# Patient Record
Sex: FEMALE | Race: WHITE | NOT HISPANIC OR LATINO | URBAN - METROPOLITAN AREA
[De-identification: names, ages, dates, MRNs, and addresses within clinical notes are randomized per-mention and may not be internally consistent; named-entity substitution may affect disease eponyms.]

---

## 2017-05-26 ENCOUNTER — OUTPATIENT (OUTPATIENT)
Dept: OUTPATIENT SERVICES | Facility: HOSPITAL | Age: 62
LOS: 1 days | Discharge: HOME | End: 2017-05-26

## 2017-06-28 DIAGNOSIS — F31.32 BIPOLAR DISORDER, CURRENT EPISODE DEPRESSED, MODERATE: ICD-10-CM

## 2017-07-28 ENCOUNTER — EMERGENCY (EMERGENCY)
Facility: HOSPITAL | Age: 62
LOS: 0 days | Discharge: HOME | End: 2017-07-28
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
## 2017-07-28 DIAGNOSIS — S51.851A OPEN BITE OF RIGHT FOREARM, INITIAL ENCOUNTER: ICD-10-CM

## 2017-07-28 DIAGNOSIS — Z79.899 OTHER LONG TERM (CURRENT) DRUG THERAPY: ICD-10-CM
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
## 2017-08-01 ENCOUNTER — EMERGENCY (EMERGENCY)
Facility: HOSPITAL | Age: 62
LOS: 0 days | Discharge: HOME | End: 2017-08-01

## 2017-08-01 DIAGNOSIS — S51.851D OPEN BITE OF RIGHT FOREARM, SUBSEQUENT ENCOUNTER: ICD-10-CM

## 2017-08-01 DIAGNOSIS — Z09 ENCOUNTER FOR FOLLOW-UP EXAMINATION AFTER COMPLETED TREATMENT FOR CONDITIONS OTHER THAN MALIGNANT NEOPLASM: ICD-10-CM
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
## 2017-08-01 DIAGNOSIS — Z79.899 OTHER LONG TERM (CURRENT) DRUG THERAPY: ICD-10-CM

## 2018-08-06 ENCOUNTER — OUTPATIENT (OUTPATIENT)
Dept: OUTPATIENT SERVICES | Facility: HOSPITAL | Age: 63
LOS: 1 days | Discharge: HOME | End: 2018-08-06

## 2018-08-06 DIAGNOSIS — F31.32 BIPOLAR DISORDER, CURRENT EPISODE DEPRESSED, MODERATE: ICD-10-CM

## 2022-09-22 ENCOUNTER — INPATIENT (INPATIENT)
Facility: HOSPITAL | Age: 67
LOS: 8 days | Discharge: HOME | End: 2022-10-01
Attending: INTERNAL MEDICINE | Admitting: INTERNAL MEDICINE

## 2022-09-22 VITALS
TEMPERATURE: 98 F | HEART RATE: 98 BPM | RESPIRATION RATE: 20 BRPM | SYSTOLIC BLOOD PRESSURE: 153 MMHG | OXYGEN SATURATION: 98 % | WEIGHT: 149.91 LBS | DIASTOLIC BLOOD PRESSURE: 83 MMHG

## 2022-09-22 DIAGNOSIS — Z90.89 ACQUIRED ABSENCE OF OTHER ORGANS: Chronic | ICD-10-CM

## 2022-09-22 DIAGNOSIS — Z98.890 OTHER SPECIFIED POSTPROCEDURAL STATES: Chronic | ICD-10-CM

## 2022-09-22 LAB
ALBUMIN SERPL ELPH-MCNC: 4.1 G/DL — SIGNIFICANT CHANGE UP (ref 3.5–5.2)
ALP SERPL-CCNC: 279 U/L — HIGH (ref 30–115)
ALT FLD-CCNC: 352 U/L — HIGH (ref 0–41)
ANION GAP SERPL CALC-SCNC: 12 MMOL/L — SIGNIFICANT CHANGE UP (ref 7–14)
APPEARANCE UR: CLEAR — SIGNIFICANT CHANGE UP
AST SERPL-CCNC: 66 U/L — HIGH (ref 0–41)
BACTERIA # UR AUTO: ABNORMAL
BASOPHILS # BLD AUTO: 0.03 K/UL — SIGNIFICANT CHANGE UP (ref 0–0.2)
BASOPHILS NFR BLD AUTO: 0.3 % — SIGNIFICANT CHANGE UP (ref 0–1)
BILIRUB SERPL-MCNC: 3.3 MG/DL — HIGH (ref 0.2–1.2)
BILIRUB UR-MCNC: ABNORMAL
BUN SERPL-MCNC: 12 MG/DL — SIGNIFICANT CHANGE UP (ref 10–20)
CALCIUM SERPL-MCNC: 10.4 MG/DL — SIGNIFICANT CHANGE UP (ref 8.4–10.5)
CHLORIDE SERPL-SCNC: 99 MMOL/L — SIGNIFICANT CHANGE UP (ref 98–110)
CHOLEST SERPL-MCNC: 234 MG/DL — HIGH
CO2 SERPL-SCNC: 25 MMOL/L — SIGNIFICANT CHANGE UP (ref 17–32)
COLOR SPEC: YELLOW — SIGNIFICANT CHANGE UP
CREAT SERPL-MCNC: 1.3 MG/DL — SIGNIFICANT CHANGE UP (ref 0.7–1.5)
DIFF PNL FLD: ABNORMAL
EGFR: 46 ML/MIN/1.73M2 — LOW
EOSINOPHIL # BLD AUTO: 0.42 K/UL — SIGNIFICANT CHANGE UP (ref 0–0.7)
EOSINOPHIL NFR BLD AUTO: 4.7 % — SIGNIFICANT CHANGE UP (ref 0–8)
EPI CELLS # UR: ABNORMAL /HPF
GLUCOSE SERPL-MCNC: 136 MG/DL — HIGH (ref 70–99)
GLUCOSE UR QL: NEGATIVE MG/DL — SIGNIFICANT CHANGE UP
HCT VFR BLD CALC: 42.3 % — SIGNIFICANT CHANGE UP (ref 37–47)
HDLC SERPL-MCNC: 54 MG/DL — SIGNIFICANT CHANGE UP
HGB BLD-MCNC: 13.3 G/DL — SIGNIFICANT CHANGE UP (ref 12–16)
IMM GRANULOCYTES NFR BLD AUTO: 0.3 % — SIGNIFICANT CHANGE UP (ref 0.1–0.3)
KETONES UR-MCNC: NEGATIVE — SIGNIFICANT CHANGE UP
LEUKOCYTE ESTERASE UR-ACNC: ABNORMAL
LIDOCAIN IGE QN: 131 U/L — HIGH (ref 7–60)
LIPID PNL WITH DIRECT LDL SERPL: 143 MG/DL — HIGH
LYMPHOCYTES # BLD AUTO: 0.51 K/UL — LOW (ref 1.2–3.4)
LYMPHOCYTES # BLD AUTO: 5.7 % — LOW (ref 20.5–51.1)
MCHC RBC-ENTMCNC: 29.2 PG — SIGNIFICANT CHANGE UP (ref 27–31)
MCHC RBC-ENTMCNC: 31.4 G/DL — LOW (ref 32–37)
MCV RBC AUTO: 93 FL — SIGNIFICANT CHANGE UP (ref 81–99)
MONOCYTES # BLD AUTO: 0.68 K/UL — HIGH (ref 0.1–0.6)
MONOCYTES NFR BLD AUTO: 7.7 % — SIGNIFICANT CHANGE UP (ref 1.7–9.3)
NEUTROPHILS # BLD AUTO: 7.2 K/UL — HIGH (ref 1.4–6.5)
NEUTROPHILS NFR BLD AUTO: 81.3 % — HIGH (ref 42.2–75.2)
NITRITE UR-MCNC: NEGATIVE — SIGNIFICANT CHANGE UP
NON HDL CHOLESTEROL: 180 MG/DL — HIGH
NRBC # BLD: 0 /100 WBCS — SIGNIFICANT CHANGE UP (ref 0–0)
PH UR: 6.5 — SIGNIFICANT CHANGE UP (ref 5–8)
PLATELET # BLD AUTO: 305 K/UL — SIGNIFICANT CHANGE UP (ref 130–400)
POTASSIUM SERPL-MCNC: 4.3 MMOL/L — SIGNIFICANT CHANGE UP (ref 3.5–5)
POTASSIUM SERPL-SCNC: 4.3 MMOL/L — SIGNIFICANT CHANGE UP (ref 3.5–5)
PROT SERPL-MCNC: 6.7 G/DL — SIGNIFICANT CHANGE UP (ref 6–8)
PROT UR-MCNC: NEGATIVE MG/DL — SIGNIFICANT CHANGE UP
RBC # BLD: 4.55 M/UL — SIGNIFICANT CHANGE UP (ref 4.2–5.4)
RBC # FLD: 13.2 % — SIGNIFICANT CHANGE UP (ref 11.5–14.5)
RBC CASTS # UR COMP ASSIST: ABNORMAL /HPF
SARS-COV-2 RNA SPEC QL NAA+PROBE: SIGNIFICANT CHANGE UP
SODIUM SERPL-SCNC: 136 MMOL/L — SIGNIFICANT CHANGE UP (ref 135–146)
SP GR SPEC: 1.01 — SIGNIFICANT CHANGE UP (ref 1.01–1.03)
TRIGL SERPL-MCNC: 186 MG/DL — HIGH
UROBILINOGEN FLD QL: 1 MG/DL
WBC # BLD: 8.87 K/UL — SIGNIFICANT CHANGE UP (ref 4.8–10.8)
WBC # FLD AUTO: 8.87 K/UL — SIGNIFICANT CHANGE UP (ref 4.8–10.8)
WBC UR QL: ABNORMAL /HPF

## 2022-09-22 PROCEDURE — 99222 1ST HOSP IP/OBS MODERATE 55: CPT | Mod: AI

## 2022-09-22 PROCEDURE — 76705 ECHO EXAM OF ABDOMEN: CPT | Mod: 26

## 2022-09-22 PROCEDURE — 99285 EMERGENCY DEPT VISIT HI MDM: CPT

## 2022-09-22 PROCEDURE — 74177 CT ABD & PELVIS W/CONTRAST: CPT | Mod: 26,MA

## 2022-09-22 RX ORDER — ZOLPIDEM TARTRATE 10 MG/1
5 TABLET ORAL AT BEDTIME
Refills: 0 | Status: DISCONTINUED | OUTPATIENT
Start: 2022-09-22 | End: 2022-09-28

## 2022-09-22 RX ORDER — PANTOPRAZOLE SODIUM 20 MG/1
40 TABLET, DELAYED RELEASE ORAL DAILY
Refills: 0 | Status: DISCONTINUED | OUTPATIENT
Start: 2022-09-22 | End: 2022-09-30

## 2022-09-22 RX ORDER — SODIUM CHLORIDE 9 MG/ML
1000 INJECTION INTRAMUSCULAR; INTRAVENOUS; SUBCUTANEOUS
Refills: 0 | Status: DISCONTINUED | OUTPATIENT
Start: 2022-09-22 | End: 2022-09-30

## 2022-09-22 RX ORDER — SODIUM CHLORIDE 9 MG/ML
1000 INJECTION, SOLUTION INTRAVENOUS ONCE
Refills: 0 | Status: COMPLETED | OUTPATIENT
Start: 2022-09-22 | End: 2022-09-22

## 2022-09-22 RX ORDER — ZOLPIDEM TARTRATE 10 MG/1
5 TABLET ORAL AT BEDTIME
Refills: 0 | Status: DISCONTINUED | OUTPATIENT
Start: 2022-09-22 | End: 2022-09-29

## 2022-09-22 RX ORDER — BUPROPION HYDROCHLORIDE 150 MG/1
150 TABLET, EXTENDED RELEASE ORAL DAILY
Refills: 0 | Status: DISCONTINUED | OUTPATIENT
Start: 2022-09-22 | End: 2022-09-30

## 2022-09-22 RX ORDER — SODIUM CHLORIDE 9 MG/ML
1000 INJECTION, SOLUTION INTRAVENOUS ONCE
Refills: 0 | Status: COMPLETED | OUTPATIENT
Start: 2022-09-22 | End: 2022-09-23

## 2022-09-22 RX ORDER — MORPHINE SULFATE 50 MG/1
4 CAPSULE, EXTENDED RELEASE ORAL EVERY 4 HOURS
Refills: 0 | Status: DISCONTINUED | OUTPATIENT
Start: 2022-09-22 | End: 2022-09-29

## 2022-09-22 RX ORDER — HALOPERIDOL DECANOATE 100 MG/ML
2 INJECTION INTRAMUSCULAR DAILY
Refills: 0 | Status: DISCONTINUED | OUTPATIENT
Start: 2022-09-22 | End: 2022-09-30

## 2022-09-22 RX ORDER — NITROFURANTOIN MACROCRYSTAL 50 MG
100 CAPSULE ORAL
Refills: 0 | Status: DISCONTINUED | OUTPATIENT
Start: 2022-09-22 | End: 2022-09-23

## 2022-09-22 RX ORDER — HEPARIN SODIUM 5000 [USP'U]/ML
5000 INJECTION INTRAVENOUS; SUBCUTANEOUS EVERY 12 HOURS
Refills: 0 | Status: DISCONTINUED | OUTPATIENT
Start: 2022-09-22 | End: 2022-09-30

## 2022-09-22 RX ORDER — KETOROLAC TROMETHAMINE 30 MG/ML
15 SYRINGE (ML) INJECTION ONCE
Refills: 0 | Status: DISCONTINUED | OUTPATIENT
Start: 2022-09-22 | End: 2022-09-22

## 2022-09-22 RX ADMIN — SODIUM CHLORIDE 1000 MILLILITER(S): 9 INJECTION, SOLUTION INTRAVENOUS at 17:14

## 2022-09-22 RX ADMIN — Medication 15 MILLIGRAM(S): at 18:41

## 2022-09-22 RX ADMIN — BUPROPION HYDROCHLORIDE 150 MILLIGRAM(S): 150 TABLET, EXTENDED RELEASE ORAL at 23:59

## 2022-09-22 RX ADMIN — Medication 100 MILLIGRAM(S): at 23:59

## 2022-09-22 NOTE — H&P ADULT - ASSESSMENT
66 yo f pmhx mood disorder presents to ER c/o urinary symptoms s/p recent UTI p/w pancreatitis, ?duodenitis and transaminitis     # acute pancreatitis   # transaminitis   - admit to medicine   - IVF resuscitation   - pain control   - GI consult   - abdominal US unremarkable   - ? duodenitis on CT   - f/u labs, including hepatic panel, hepatitis panel, igg4, lipase     #recent UTI   - c/w nitrofurantion x 1 more dose   - f/u UA/urine culture     #mildly elevated triglycerides  (Trig 189 - unlikely cause of pancreatitis)  - instructed on lifestyle modifications       # h/o mood disorder   - c/w lorazepam, haldol, bupropion  - hold trileptal (oxcarbamazepine has been associated with pancreatitis       #diet - dash  #dvt ppx- HSQ  # full code      68 yo f pmhx bipolar mood disorder presents to ER c/o urinary symptoms s/p recent UTI p/w pancreatitis, ?duodenitis and transaminitis     # acute pancreatitis   # transaminitis   - admit to medicine   - IVF resuscitation   - pain control   - GI consult   - abdominal US unremarkable   - ? duodenitis on CT   - f/u labs, including hepatic panel, hepatitis panel, igg4, lipase     #recent UTI   - c/w nitrofurantion x 1 more dose   - f/u UA/urine culture     #mildly elevated triglycerides  (Trig 189 - unlikely cause of pancreatitis)  - instructed on lifestyle modifications       # h/o mood disorder   - c/w lorazepam, haldol, bupropion  - hold trileptal (oxcarbamazepine has been associated with cases of pancreatitis)      #diet - dash  #dvt ppx- HSQ  # full code      66 yo f pmhx bipolar mood disorder presents to ER c/o urinary symptoms s/p recent UTI p/w pancreatitis, ?duodenitis and transaminitis     # acute pancreatitis   # transaminitis   - admit to medicine   - IVF resuscitation   - bowel rest   - pain control   - GI consult   - abdominal US unremarkable   - ? duodenitis on CT   - f/u labs, including hepatic panel, hepatitis panel, igg4, lipase     #recent UTI   - c/w nitrofurantion x 1 more dose   - f/u UA/urine culture     #mildly elevated triglycerides  (Trig 189 - unlikely cause of pancreatitis)  - instructed on lifestyle modifications       # h/o mood disorder   - c/w lorazepam, haldol, bupropion  - hold trileptal (oxcarbamazepine has been associated with cases of pancreatitis)      #diet - npo   #dvt ppx- HSQ  # gi ppx  - ppi   # full code      66 yo f pmhx bipolar mood disorder presents to ER c/o urinary symptoms s/p recent UTI p/w pancreatitis, ?duodenitis and transaminitis     # acute pancreatitis undetermined etiology however nitrofurantoin can cause pancreatitis.  # transaminitis   - admit to medicine   - IVF resuscitation   - bowel rest   - pain control   - GI consult   - abdominal US unremarkable   - ? duodenitis on CT   - f/u labs, including hepatic panel, hepatitis panel, igg4, lipase   - trend lipase    #recent UTI   - c/w nitrofurantion x 1 more dose   - f/u UA/urine culture     #mildly elevated triglycerides  (Trig 189 - unlikely cause of pancreatitis)  - instructed on lifestyle modifications       # h/o mood disorder   - c/w lorazepam, haldol, bupropion  - hold trileptal (oxcarbamazepine has been associated with cases of pancreatitis)      #diet - npo   #dvt ppx- HSQ  # gi ppx  - ppi   # full code

## 2022-09-22 NOTE — ED PROVIDER NOTE - CLINICAL SUMMARY MEDICAL DECISION MAKING FREE TEXT BOX
patient found to have pancreatitis not related to alcohol use, we obtained lipid panel I will admit for further workup at this time

## 2022-09-22 NOTE — H&P ADULT - HISTORY OF PRESENT ILLNESS
pt is a 68 yo f pmhx mood disorder presents to ER c/o urinary symptoms. pt states on Sunday she felt tired and having discomfort while urinating, presented to clinic and was sent home on nitrofurantion. Pt took antibiotics x 4 days with minimal relief. Presents today with low back pain, denies fever/chills. On lab work was found to have elevated lipase, elevated bilis.   pt is a 66 yo f pmhx bipolar mood disorder presents to ER c/o urinary symptoms. pt states on Sunday she felt tired and having discomfort while urinating, presented to clinic and was sent home on nitrofurantion. Pt took antibiotics x 4 days with minimal relief. Presents today with low back pain, denies fever/chills. On lab work was found to have elevated lipase, elevated bilis.

## 2022-09-22 NOTE — ED PROVIDER NOTE - NS ED ATTENDING STATEMENT MOD
This was a shared visit with the [facility]. I reviewed and verified the documentation and independently performed the documented: Attending with

## 2022-09-22 NOTE — H&P ADULT - NSICDXFAMILYHX_GEN_ALL_CORE_FT
FAMILY HISTORY:  Father  Still living? Unknown  FH: leukemia, Age at diagnosis: Age Unknown    Mother  Still living? Unknown  FH: colon cancer, Age at diagnosis: Age Unknown

## 2022-09-22 NOTE — ED PROVIDER NOTE - OBJECTIVE STATEMENT
67-year-old female no past medical history coming here for repeated urinary symptoms.  Patient was recently treated with a UTI but is now complaining of vague abdominal pain and neck pain.  Patient also states that she feels weak and more tired than usual.  Patient eating less but still making urine and bowel movements.  No other complaints

## 2022-09-22 NOTE — H&P ADULT - NSHPREVIEWOFSYSTEMS_GEN_ALL_CORE
Constitutional: (-) fever (-) chills   Eyes: (-) visual changes  ENMT: (-) nasal or chest congestion (-) runny nose (-) sore throat (-) neck pain (-) neck stiffness  Cardiac: (-) chest pain (-) syncope  Respiratory: (-) cough (-) SOB  GI: (-) nausea (-) vomiting (-) diarrhea  : (-) incontinence  MS:(+) back pain   Neuro: (-) head injury (-) headache (-) dizziness (-) numbness/tingling to extremities B/L (-) LOC   Skin: (-) abrasion (-) rash (-) laceration  Except as documented in the HPI, all other systems are negative.

## 2022-09-22 NOTE — H&P ADULT - NSHPLABSRESULTS_GEN_ALL_CORE
13.3   8.87  )-----------( 305      ( 22 Sep 2022 16:54 )             42.3   Urinalysis Basic - ( 22 Sep 2022 16:54 )    Color: Yellow / Appearance: Clear / S.010 / pH: x  Gluc: x / Ketone: Negative  / Bili: Small / Urobili: 1.0 mg/dL   Blood: x / Protein: Negative mg/dL / Nitrite: Negative   Leuk Esterase: Small / RBC: 3-5 /HPF / WBC 6-10 /HPF   Sq Epi: x / Non Sq Epi: Moderate /HPF / Bacteria: Few        136  |  99  |  12  ----------------------------<  136<H>  4.3   |  25  |  1.3    Ca    10.4      22 Sep 2022 16:54    TPro  6.7  /  Alb  4.1  /  TBili  3.3<H>  /  DBili  x   /  AST  66<H>  /  ALT  352<H>  /  AlkPhos  279<H>        < from: CT Abdomen and Pelvis w/ IV Cont (22 @ 18:33) >    IMPRESSION:    1. Indistinct pancreatic head hypodensity, possibly due to mild focal   interstitial edematous pancreatitis. In addition, there are subtle   findings of right upper quadrant inflammatory change including mild   gallbladder wall thickening and circumferential duodenal thickening that   may be seen with duodenitis or secondary to pancreatic inflammation.    If patient's symptomatology persists, follow-up CT with intravenous   contrast is recommended to further evaluate the pancreas.      2. Chip hepatis 1.5 cm lymph node and additional para-aortic and   paracaval lymph nodes, possibly reactive. Short-term follow-up with MRI   is recommended to exclude other etiologies.    < end of copied text >    < from: US Abdomen Upper Quadrant Right (22 @ 19:03) >    IMPRESSION:  Normal right upper quadrant abdominal ultrasound.    < end of copied text >

## 2022-09-22 NOTE — H&P ADULT - NSHPPHYSICALEXAM_GEN_ALL_CORE
ICU Vital Signs Last 24 Hrs  T(C): 36.9 (22 Sep 2022 16:26), Max: 36.9 (22 Sep 2022 16:26)  T(F): 98.4 (22 Sep 2022 16:26), Max: 98.4 (22 Sep 2022 16:26)  HR: 98 (22 Sep 2022 16:26) (98 - 98)  BP: 153/83 (22 Sep 2022 16:26) (153/83 - 153/83)  BP(mean): --  ABP: --  ABP(mean): --  RR: 20 (22 Sep 2022 16:26) (20 - 20)  SpO2: 98% (22 Sep 2022 16:26) (98% - 98%)    O2 Parameters below as of 22 Sep 2022 16:26  Patient On (Oxygen Delivery Method): room air      Constitutional: NAD, well-nourished, non toxic appearing   HEENT: Airway patent, moist MM, no erythema/swelling/deformity of oral structures. EOMI, PERRLA.  CV: regular rate, regular rhythm, well-perfused extremities, 2+ b/l DP and radial pulses equal.  Lungs: BCTA, no increased WOB.  ABD: NTND, no guarding or rebound, no pulsatile mass, no hernias.   MSK: Neck supple, nontender, nl ROM, no stepoff. Chest nontender. Back nontender in TLS spine or to b/l bony structures or flanks. Ext nontender, nl rom, no deformity.   INTEG: Skin warm, dry, no rash.  NEURO: A&Ox3, normal strength, nl sensation throughout, normal speech.   PSYCH: Denies SI/HI/hallucinations

## 2022-09-22 NOTE — ED PROVIDER NOTE - ATTENDING APP SHARED VISIT CONTRIBUTION OF CARE
67-year-old female with repeated UTIs for evaluation of vague abdominal pain concern for urinary tract infection.  Patient reports feeling more tired than usual.  Agree with above exam  impression  Patient with vague abdominal pain possible UTI.  Patient here with small leukocytes in urine however elevated liver function tests and lipase.  Patient pending CT on pelvis and right upper quadrant sono.

## 2022-09-23 LAB
ALBUMIN SERPL ELPH-MCNC: 3.4 G/DL — LOW (ref 3.5–5.2)
ALP SERPL-CCNC: 231 U/L — HIGH (ref 30–115)
ALT FLD-CCNC: 233 U/L — HIGH (ref 0–41)
ANION GAP SERPL CALC-SCNC: 12 MMOL/L — SIGNIFICANT CHANGE UP (ref 7–14)
AST SERPL-CCNC: 46 U/L — HIGH (ref 0–41)
BILIRUB DIRECT SERPL-MCNC: 2.6 MG/DL — HIGH (ref 0–0.3)
BILIRUB INDIRECT FLD-MCNC: 0.3 MG/DL — SIGNIFICANT CHANGE UP (ref 0.2–1.2)
BILIRUB SERPL-MCNC: 2.9 MG/DL — HIGH (ref 0.2–1.2)
BUN SERPL-MCNC: 13 MG/DL — SIGNIFICANT CHANGE UP (ref 10–20)
CALCIUM SERPL-MCNC: 9.6 MG/DL — SIGNIFICANT CHANGE UP (ref 8.4–10.5)
CHLORIDE SERPL-SCNC: 102 MMOL/L — SIGNIFICANT CHANGE UP (ref 98–110)
CK MB CFR SERPL CALC: 1.1 NG/ML — SIGNIFICANT CHANGE UP (ref 0.6–6.3)
CK MB CFR SERPL CALC: 1.4 NG/ML — SIGNIFICANT CHANGE UP (ref 0.6–6.3)
CK SERPL-CCNC: 35 U/L — SIGNIFICANT CHANGE UP (ref 0–225)
CK SERPL-CCNC: 36 U/L — SIGNIFICANT CHANGE UP (ref 0–225)
CO2 SERPL-SCNC: 24 MMOL/L — SIGNIFICANT CHANGE UP (ref 17–32)
CREAT SERPL-MCNC: 1.3 MG/DL — SIGNIFICANT CHANGE UP (ref 0.7–1.5)
EGFR: 46 ML/MIN/1.73M2 — LOW
GLUCOSE SERPL-MCNC: 92 MG/DL — SIGNIFICANT CHANGE UP (ref 70–99)
HCT VFR BLD CALC: 35.1 % — LOW (ref 37–47)
HCV AB S/CO SERPL IA: 0.04 COI — SIGNIFICANT CHANGE UP
HCV AB SERPL-IMP: SIGNIFICANT CHANGE UP
HGB BLD-MCNC: 11.3 G/DL — LOW (ref 12–16)
LIDOCAIN IGE QN: 78 U/L — HIGH (ref 7–60)
MCHC RBC-ENTMCNC: 29.8 PG — SIGNIFICANT CHANGE UP (ref 27–31)
MCHC RBC-ENTMCNC: 32.2 G/DL — SIGNIFICANT CHANGE UP (ref 32–37)
MCV RBC AUTO: 92.6 FL — SIGNIFICANT CHANGE UP (ref 81–99)
NRBC # BLD: 0 /100 WBCS — SIGNIFICANT CHANGE UP (ref 0–0)
PLATELET # BLD AUTO: 283 K/UL — SIGNIFICANT CHANGE UP (ref 130–400)
POTASSIUM SERPL-MCNC: 3.9 MMOL/L — SIGNIFICANT CHANGE UP (ref 3.5–5)
POTASSIUM SERPL-SCNC: 3.9 MMOL/L — SIGNIFICANT CHANGE UP (ref 3.5–5)
PROT SERPL-MCNC: 5.3 G/DL — LOW (ref 6–8)
RBC # BLD: 3.79 M/UL — LOW (ref 4.2–5.4)
RBC # FLD: 13.3 % — SIGNIFICANT CHANGE UP (ref 11.5–14.5)
SODIUM SERPL-SCNC: 138 MMOL/L — SIGNIFICANT CHANGE UP (ref 135–146)
TROPONIN T SERPL-MCNC: <0.01 NG/ML — SIGNIFICANT CHANGE UP
TROPONIN T SERPL-MCNC: <0.01 NG/ML — SIGNIFICANT CHANGE UP
WBC # BLD: 6.14 K/UL — SIGNIFICANT CHANGE UP (ref 4.8–10.8)
WBC # FLD AUTO: 6.14 K/UL — SIGNIFICANT CHANGE UP (ref 4.8–10.8)

## 2022-09-23 PROCEDURE — 99233 SBSQ HOSP IP/OBS HIGH 50: CPT

## 2022-09-23 PROCEDURE — 93010 ELECTROCARDIOGRAM REPORT: CPT

## 2022-09-23 PROCEDURE — 99222 1ST HOSP IP/OBS MODERATE 55: CPT

## 2022-09-23 RX ORDER — IBUPROFEN 200 MG
400 TABLET ORAL
Refills: 0 | Status: DISCONTINUED | OUTPATIENT
Start: 2022-09-23 | End: 2022-10-01

## 2022-09-23 RX ADMIN — ZOLPIDEM TARTRATE 5 MILLIGRAM(S): 10 TABLET ORAL at 22:53

## 2022-09-23 RX ADMIN — HALOPERIDOL DECANOATE 2 MILLIGRAM(S): 100 INJECTION INTRAMUSCULAR at 00:01

## 2022-09-23 RX ADMIN — Medication 1 TABLET(S): at 18:04

## 2022-09-23 RX ADMIN — HALOPERIDOL DECANOATE 2 MILLIGRAM(S): 100 INJECTION INTRAMUSCULAR at 21:53

## 2022-09-23 RX ADMIN — MORPHINE SULFATE 4 MILLIGRAM(S): 50 CAPSULE, EXTENDED RELEASE ORAL at 10:24

## 2022-09-23 RX ADMIN — SODIUM CHLORIDE 125 MILLILITER(S): 9 INJECTION INTRAMUSCULAR; INTRAVENOUS; SUBCUTANEOUS at 14:23

## 2022-09-23 RX ADMIN — MORPHINE SULFATE 4 MILLIGRAM(S): 50 CAPSULE, EXTENDED RELEASE ORAL at 00:11

## 2022-09-23 RX ADMIN — MORPHINE SULFATE 4 MILLIGRAM(S): 50 CAPSULE, EXTENDED RELEASE ORAL at 22:11

## 2022-09-23 RX ADMIN — BUPROPION HYDROCHLORIDE 150 MILLIGRAM(S): 150 TABLET, EXTENDED RELEASE ORAL at 14:21

## 2022-09-23 RX ADMIN — HEPARIN SODIUM 5000 UNIT(S): 5000 INJECTION INTRAVENOUS; SUBCUTANEOUS at 06:44

## 2022-09-23 RX ADMIN — Medication 0.5 MILLIGRAM(S): at 00:11

## 2022-09-23 RX ADMIN — MORPHINE SULFATE 4 MILLIGRAM(S): 50 CAPSULE, EXTENDED RELEASE ORAL at 21:51

## 2022-09-23 RX ADMIN — SODIUM CHLORIDE 1000 MILLILITER(S): 9 INJECTION, SOLUTION INTRAVENOUS at 00:01

## 2022-09-23 RX ADMIN — PANTOPRAZOLE SODIUM 40 MILLIGRAM(S): 20 TABLET, DELAYED RELEASE ORAL at 14:21

## 2022-09-23 RX ADMIN — MORPHINE SULFATE 4 MILLIGRAM(S): 50 CAPSULE, EXTENDED RELEASE ORAL at 14:24

## 2022-09-23 RX ADMIN — Medication 100 MILLIGRAM(S): at 06:44

## 2022-09-23 RX ADMIN — ZOLPIDEM TARTRATE 5 MILLIGRAM(S): 10 TABLET ORAL at 00:11

## 2022-09-23 RX ADMIN — MORPHINE SULFATE 4 MILLIGRAM(S): 50 CAPSULE, EXTENDED RELEASE ORAL at 08:52

## 2022-09-23 RX ADMIN — MORPHINE SULFATE 4 MILLIGRAM(S): 50 CAPSULE, EXTENDED RELEASE ORAL at 15:47

## 2022-09-23 RX ADMIN — HEPARIN SODIUM 5000 UNIT(S): 5000 INJECTION INTRAVENOUS; SUBCUTANEOUS at 18:05

## 2022-09-23 NOTE — CHART NOTE - NSCHARTNOTEFT_GEN_A_CORE
Pt c/o episode of lower chest pain. Pt felt it was secondary to gas. rated 5/10. Has no pain presently  EKG shows no ischemic change.  D/w Dr Cameron would like to r/o acs on tele, trend ce, transfer to telemetry.

## 2022-09-23 NOTE — PROGRESS NOTE ADULT - ASSESSMENT
68 yo f pmhx bipolar mood disorder presents to ER c/o urinary symptoms s/p recent UTI p/w pancreatitis, ?duodenitis and transaminitis     CLEAR LIQUID DIET STARTED   Indistinct pancreatic head hypodensity, possibly due to mild focal   interstitial edematous pancreatitis. In addition, there are subtle   findings of right upper quadrant inflammatory change including mild   gallbladder wall thickening and circumferential duodenal thickening that   may be seen with duodenitis or secondary to pancreatic inflammation.  If patient's symptomatology persists, follow-up CT with intravenous   contrast is recommended to further evaluate the pancreas.  transaminitis    transaminitis   - IVF resuscitation   - bowel rest   - pain control   - GI consult   - abdominal US unremarkable   - duodenitis on CT   - f/u labs, including hepatic panel, hepatitis panel, igg4, lipase   - trend lipase  d/c macrobid  UTI START AUGMENTIN 875MG PO Q 12HRS  #recent UTI   - f/u UA/urine culture     mildly elevated triglycerides  (Trig 189 - unlikely cause of pancreatitis)  normal caliber CBD  ddx r/o CBD stone, possibly DILI  Rec  -Avoid hepatotoxic medications  -to consider EUS vs MRI with and without contrast pancreatic protocol when pancreatitis improves  -will call [facility] for approval  -Monitor LFTs daily

## 2022-09-23 NOTE — CONSULT NOTE ADULT - SUBJECTIVE AND OBJECTIVE BOX
Chief complaint/Reason for consult: acute pancreatitis    HPI:  pt is a 66 yo f pmhx bipolar mood disorder presents to ER c/o urinary symptoms. pt states on Sunday she felt tired and having discomfort while urinating, presented to clinic and was sent home on nitrofurantion. Pt took antibiotics x 4 days with minimal relief. Presents today with low back pain, denies fever/chills. On lab work was found to have elevated lipase, elevated bilis.   (22 Sep 2022 22:55)    GI Updates: 67yFemale pmh bipolar mood disorder on macrobid for UTI presents for mild epigastric pain and back pain. Currently patient feeling much better. Patient denies nausea, vomiting, hematemesis, melena, blood in stool, diarrhea, constipation, abdominal pain.      PAST MEDICAL & SURGICAL HISTORY:   Bipolar mood disorder      S/P tonsillectomy      H/O ovarian cystectomy            Family history:  FAMILY HISTORY:  FH: leukemia (Father)    FH: colon cancer (Mother)      No GI cancers in first or second degree relatives    Social History: No smoking. No alcohol. No illegal drug use.    Allergies:  No Known Allergies  Intolerances          MEDICATIONS: Home Medications:  BUPROPION HCL  MG TABLET: TAKE ONE TABLET PER ORAL ROUTE ONCE A DAY (22 Sep 2022 22:25)  FLUCONAZOLE 150 MG TABLET:  (22 Sep 2022 22:25)  HALOPERIDOL 2 MG TABLET:  (22 Sep 2022 22:25)  LORAZEPAM 0.5 MG TABLET:  (22 Sep 2022 22:25)  OXCARBAZEPINE 150 MG TABLET:  (22 Sep 2022 22:25)  Trileptal 150 mg oral tablet: orally once a day (22 Sep 2022 22:25)  ZOLPIDEM TARTRATE 10 MG TABLET:  (22 Sep 2022 22:25)    MEDICATIONS  (STANDING):  amoxicillin  875 milliGRAM(s)/clavulanate 1 Tablet(s) Oral two times a day  buPROPion XL (24-Hour) . 150 milliGRAM(s) Oral daily  haloperidol     Tablet 2 milliGRAM(s) Oral daily  heparin   Injectable 5000 Unit(s) SubCutaneous every 12 hours  pantoprazole  Injectable 40 milliGRAM(s) IV Push daily  sodium chloride 0.9%. 1000 milliLiter(s) (125 mL/Hr) IV Continuous <Continuous>    MEDICATIONS  (PRN):  LORazepam     Tablet 0.5 milliGRAM(s) Oral every 12 hours PRN Anxiety  morphine  - Injectable 4 milliGRAM(s) IV Push every 4 hours PRN Severe Pain (7 - 10)  zolpidem 5 milliGRAM(s) Oral at bedtime PRN Insomnia  zolpidem 5 milliGRAM(s) Oral at bedtime PRN Insomnia        REVIEW OF SYSTEMS  General:  No weight loss, fevers, or chills.  Eyes:  No reported pain or visual changes  ENT:  No sore throat or runny nose.  NECK: No stiffness or lymphadenopathy  CV:  No chest pain or palpitations.  Resp:  No shortness of breath, cough, wheezing or hemoptysis  GI:  No abdominal pain, nausea, vomiting, dysphagia, diarrhea or constipation. No rectal bleeding, melena, or hematemesis.  Muscle:  No aches or weakness  Neuro:  No tingling, numbness       VITALS:   T(F): 97.4 (09-23-22 @ 05:00), Max: 99 (09-23-22 @ 02:21)  HR: 80 (09-23-22 @ 05:00) (78 - 98)  BP: 128/73 (09-23-22 @ 05:00) (113/68 - 153/83)  RR: 18 (09-23-22 @ 05:00) (18 - 20)  SpO2: 98% (09-23-22 @ 06:29) (97% - 98%)    PHYSICAL EXAM:  GENERAL: AAOx3, no acute distress.  HEAD:  Atraumatic, Normocephalic  EYES: conjunctiva and sclera clear  NECK: Supple, No thyromegaly   CHEST/LUNG: Clear to auscultation bilaterally; No wheeze, rhonchi, or rales  HEART: Regular rate and rhythm; normal S1, S2, No murmurs.  ABDOMEN: Soft, nontender, nondistended; Bowel sounds present  NEUROLOGY: No asterixis or tremor  SKIN: Intact, no jaundice          LABS:  09-23    138  |  102  |  13  ----------------------------<  92  3.9   |  24  |  1.3    Ca    9.6      23 Sep 2022 06:22    TPro  5.3<L>  /  Alb  3.4<L>  /  TBili  2.9<H>  /  DBili  2.6<H>  /  AST  46<H>  /  ALT  233<H>  /  AlkPhos  231<H>  09-23                          11.3   6.14  )-----------( 283      ( 23 Sep 2022 06:22 )             35.1     LIVER FUNCTIONS - ( 23 Sep 2022 06:22 )  Alb: 3.4 g/dL / Pro: 5.3 g/dL / ALK PHOS: 231 U/L / ALT: 233 U/L / AST: 46 U/L / GGT: x               IMAGING:    < from: CT Abdomen and Pelvis w/ IV Cont (09.22.22 @ 18:33) >    ACC: [id number] EXAM:  CT ABDOMEN AND PELVIS IC                          PROCEDURE DATE:  09/22/2022          INTERPRETATION:  CLINICAL STATEMENT: Epigastric pain  urinary symptoms.   WBC 8.87  Lipase 131    TECHNIQUE: Contiguous axial CT images wereobtained from the lower chest   to the pubic symphysis following the administration of 94 cc Omnipaque   350 intravenous contrast.  Oral contrast was administered.  Reformatted   images in the coronal and sagittal planes were acquired.    COMPARISON CT: None.    FINDINGS:    LOWER CHEST: Unremarkable.    HEPATOBILIARY: Mild gallbladder wall thickening, nonspecific.    SPLEEN: Unremarkable.    PANCREAS: Indistinct pancreatic head hypodensity, not able to be measured   (4-101).    ADRENAL GLANDS: Thickening of the left adrenal gland. Unremarkable right   adrenal gland.    KIDNEYS: Symmetric renal enhancement. No hydronephrosis. Bilateral renal   cysts and additional subcentimeter hypodensities, too small to   characterize.    ABDOMINOPELVIC NODES: Chip hepatis 1.5 cm lymph node and additional   para-aortic and paracaval lymph nodes..    PELVIC ORGANS: Unremarkable.    PERITONEUM/MESENTERY/BOWEL: No bowel obstruction, pneumoperitoneum or   ascites. Normal appendix. Colonic diverticulosis without acute   diverticulitis. Mild thickening of the descending portion of the duodenum   (4-126).    BONES/SOFT TISSUES: Degenerative changes of the spine. Grade 1   anterolisthesis of L4 on L5.    OTHER: Vascular calcifications.      IMPRESSION:    1. Indistinct pancreatic head hypodensity, possibly due to mild focal   interstitial edematous pancreatitis. In addition, there are subtle   findings of right upper quadrant inflammatory change including mild   gallbladder wall thickening and circumferential duodenal thickening that   may be seen with duodenitis or secondary to pancreatic inflammation.    If patient's symptomatology persists, follow-up CT with intravenous   contrast is recommended to further evaluate the pancreas.      2. Chip hepatis 1.5 cm lymph node and additional para-aortic and   paracaval lymph nodes, possibly reactive. Short-term follow-up with MRI   is recommended to exclude other etiologies.    --- End of Report ---          LORNE GOMEZ DO; Resident Radiologist  This document has been electronically signed.  MONTRELL VANEGAS MD; Attending Interventional Radiologist  This document has been electronically signed. Sep 22 2022  7:33PM    < end of copied text >      < from: US Abdomen Upper Quadrant Right (09.22.22 @ 19:03) >  ACC: [id number] EXAM:  US ABDOMEN RT UPR QUADRANT                          PROCEDURE DATE:  09/22/2022          INTERPRETATION:  CLINICAL INFORMATION: Elevated liver function tests    COMPARISON: None available.    TECHNIQUE: Sonography of the right upper quadrant.    FINDINGS:  Liver: Within normal limits.  Bile ducts: Normal caliber. Common bile duct measures 6 mm.  Gallbladder: Within normal limits.  Pancreas: Visualized portions are within normal limits.  Right kidney: 9.8 cm. No hydronephrosis.  Ascites: None.  IVC: Visualized portions are within normal limits.    IMPRESSION:  Normal right upper quadrant abdominal ultrasound.        --- End of Report ---            CLEVELAND THAYER MD; Attending Radiologist  This document has been electronically signed. Sep 22 2022  7:56PM    < end of copied text >       Chief complaint/Reason for consult: acute pancreatitis    HPI:  pt is a 66 yo f pmhx bipolar mood disorder presents to ER c/o urinary symptoms. pt states on Sunday she felt tired and having discomfort while urinating, presented to clinic and was sent home on nitrofurantion. Pt took antibiotics x 4 days with minimal relief. Presents today with low back pain, denies fever/chills. On lab work was found to have elevated lipase, elevated bilis.   (22 Sep 2022 22:55)    GI Updates: 67yFemale pmh bipolar mood disorder on macrobid for UTI presents for mild epigastric pain and back pain. Currently patient feeling much better. Patient denies nausea, vomiting, hematemesis, melena, blood in stool, diarrhea, constipation, abdominal pain.      PAST MEDICAL & SURGICAL HISTORY:   Bipolar mood disorder      S/P tonsillectomy      H/O ovarian cystectomy            Family history:  FAMILY HISTORY:  FH: leukemia (Father)    FH: colon cancer (Mother)      No GI cancers in first or second degree relatives    Social History: No smoking. No alcohol. No illegal drug use.    Allergies:  No Known Allergies  Intolerances          MEDICATIONS: Home Medications:  BUPROPION HCL  MG TABLET: TAKE ONE TABLET PER ORAL ROUTE ONCE A DAY (22 Sep 2022 22:25)  FLUCONAZOLE 150 MG TABLET:  (22 Sep 2022 22:25)  HALOPERIDOL 2 MG TABLET:  (22 Sep 2022 22:25)  LORAZEPAM 0.5 MG TABLET:  (22 Sep 2022 22:25)  OXCARBAZEPINE 150 MG TABLET:  (22 Sep 2022 22:25)  Trileptal 150 mg oral tablet: orally once a day (22 Sep 2022 22:25)  ZOLPIDEM TARTRATE 10 MG TABLET:  (22 Sep 2022 22:25)    MEDICATIONS  (STANDING):  amoxicillin  875 milliGRAM(s)/clavulanate 1 Tablet(s) Oral two times a day  buPROPion XL (24-Hour) . 150 milliGRAM(s) Oral daily  haloperidol     Tablet 2 milliGRAM(s) Oral daily  heparin   Injectable 5000 Unit(s) SubCutaneous every 12 hours  pantoprazole  Injectable 40 milliGRAM(s) IV Push daily  sodium chloride 0.9%. 1000 milliLiter(s) (125 mL/Hr) IV Continuous <Continuous>    MEDICATIONS  (PRN):  LORazepam     Tablet 0.5 milliGRAM(s) Oral every 12 hours PRN Anxiety  morphine  - Injectable 4 milliGRAM(s) IV Push every 4 hours PRN Severe Pain (7 - 10)  zolpidem 5 milliGRAM(s) Oral at bedtime PRN Insomnia  zolpidem 5 milliGRAM(s) Oral at bedtime PRN Insomnia        REVIEW OF SYSTEMS  General:  No weight loss, fevers, or chills.  Eyes:  No reported pain or visual changes  ENT:  No sore throat or runny nose.  NECK: No stiffness or lymphadenopathy  CV:  No chest pain or palpitations.  Resp:  No shortness of breath, cough, wheezing or hemoptysis  GI:  No abdominal pain, nausea, vomiting, dysphagia, diarrhea or constipation. No rectal bleeding, melena, or hematemesis.  Muscle:  No aches or weakness  Neuro:  No tingling, numbness       VITALS:   T(F): 97.4 (09-23-22 @ 05:00), Max: 99 (09-23-22 @ 02:21)  HR: 80 (09-23-22 @ 05:00) (78 - 98)  BP: 128/73 (09-23-22 @ 05:00) (113/68 - 153/83)  RR: 18 (09-23-22 @ 05:00) (18 - 20)  SpO2: 98% (09-23-22 @ 06:29) (97% - 98%)    PHYSICAL EXAM:  GENERAL: AAOx3, no acute distress.  HEAD:  Atraumatic, Normocephalic  EYES: conjunctiva and sclera clear  NECK: Supple, No thyromegaly   CHEST/LUNG: Clear to auscultation bilaterally; No wheeze, rhonchi, or rales  HEART: Regular rate and rhythm; normal S1, S2, No murmurs.  ABDOMEN: Soft, nontender, nondistended; Bowel sounds present  NEUROLOGY: No asterixis or tremor  SKIN: Intact, no jaundice          LABS:  09-23    138  |  102  |  13  ----------------------------<  92  reviewed  3.9   |  24  |  1.3    Ca    9.6      23 Sep 2022 06:22    TPro  5.3<L>  /  Alb  3.4<L>  /  TBili  2.9<H>  /  DBili  2.6<H>  /  AST  46<H>  /  ALT  233<H>  /  AlkPhos  231<H>  09-23 reviewed                          11.3   6.14  )-----------( 283      ( 23 Sep 2022 06:22 ) reviewed             35.1     LIVER FUNCTIONS - ( 23 Sep 2022 06:22 )  Alb: 3.4 g/dL / Pro: 5.3 g/dL / ALK PHOS: 231 U/L / ALT: 233 U/L / AST: 46 U/L / GGT: x       reviewed        IMAGING:    < from: CT Abdomen and Pelvis w/ IV Cont (09.22.22 @ 18:33) >reviewed    ACC: [id number] EXAM:  CT ABDOMEN AND PELVIS IC                          PROCEDURE DATE:  09/22/2022          INTERPRETATION:  CLINICAL STATEMENT: Epigastric pain  urinary symptoms.   WBC 8.87  Lipase 131    TECHNIQUE: Contiguous axial CT images wereobtained from the lower chest   to the pubic symphysis following the administration of 94 cc Omnipaque   350 intravenous contrast.  Oral contrast was administered.  Reformatted   images in the coronal and sagittal planes were acquired.    COMPARISON CT: None.    FINDINGS:    LOWER CHEST: Unremarkable.    HEPATOBILIARY: Mild gallbladder wall thickening, nonspecific.    SPLEEN: Unremarkable.    PANCREAS: Indistinct pancreatic head hypodensity, not able to be measured   (4-101).    ADRENAL GLANDS: Thickening of the left adrenal gland. Unremarkable right   adrenal gland.    KIDNEYS: Symmetric renal enhancement. No hydronephrosis. Bilateral renal   cysts and additional subcentimeter hypodensities, too small to   characterize.    ABDOMINOPELVIC NODES: Chip hepatis 1.5 cm lymph node and additional   para-aortic and paracaval lymph nodes..    PELVIC ORGANS: Unremarkable.    PERITONEUM/MESENTERY/BOWEL: No bowel obstruction, pneumoperitoneum or   ascites. Normal appendix. Colonic diverticulosis without acute   diverticulitis. Mild thickening of the descending portion of the duodenum   (4-126).    BONES/SOFT TISSUES: Degenerative changes of the spine. Grade 1   anterolisthesis of L4 on L5.    OTHER: Vascular calcifications.      IMPRESSION:    1. Indistinct pancreatic head hypodensity, possibly due to mild focal   interstitial edematous pancreatitis. In addition, there are subtle   findings of right upper quadrant inflammatory change including mild   gallbladder wall thickening and circumferential duodenal thickening that   may be seen with duodenitis or secondary to pancreatic inflammation.    If patient's symptomatology persists, follow-up CT with intravenous   contrast is recommended to further evaluate the pancreas.      2. Chip hepatis 1.5 cm lymph node and additional para-aortic and   paracaval lymph nodes, possibly reactive. Short-term follow-up with MRI   is recommended to exclude other etiologies.    --- End of Report ---          LORNE GOMEZ DO; Resident Radiologist  This document has been electronically signed.  MONTRELL VANEGAS MD; Attending Interventional Radiologist  This document has been electronically signed. Sep 22 2022  7:33PM    < end of copied text >      < from: US Abdomen Upper Quadrant Right (09.22.22 @ 19:03) >  ACC: [id number] EXAM:  US ABDOMEN RT UPR QUADRANT                          PROCEDURE DATE:  09/22/2022          INTERPRETATION:  CLINICAL INFORMATION: Elevated liver function tests    COMPARISON: None available.    TECHNIQUE: Sonography of the right upper quadrant.    FINDINGS:  Liver: Within normal limits.  Bile ducts: Normal caliber. Common bile duct measures 6 mm.  Gallbladder: Within normal limits.  Pancreas: Visualized portions are within normal limits.  Right kidney: 9.8 cm. No hydronephrosis.  Ascites: None.  IVC: Visualized portions are within normal limits.    IMPRESSION:  Normal right upper quadrant abdominal ultrasound.        --- End of Report ---            CLEVELAND THAYER MD; Attending Radiologist  This document has been electronically signed. Sep 22 2022  7:56PM    < end of copied text >

## 2022-09-23 NOTE — CONSULT NOTE ADULT - ASSESSMENT
67yFemale pmh bipolar mood disorder on macrobid for UTI presents for mild epigastric pain and back pain. Currently patient feeling much better.      Problem 1-Acute pancreatitis 2/3 clinical picture and CT imaging show acute pancreatitis    Indistinct pancreatic head hypodensity, possibly due to mild focal   interstitial edematous pancreatitis. In addition, there are subtle   findings of right upper quadrant inflammatory change including mild   gallbladder wall thickening and circumferential duodenal thickening that   may be seen with duodenitis or secondary to pancreatic inflammation.  If patient's symptomatology persists, follow-up CT with intravenous   contrast is recommended to further evaluate the pancreas.  transaminitis   Rec  -to consider EUS vs MRI with and without contrast pancreatic protocol when pancreatitis improves  -will call [facility] for approval  -Monitor LFTs daily   -start clear liquid diet  -RUQ Abdominal Ultrasound appreciated   -Fasting Lipid Panel   -Aggressive IV hydration with lactated ringers, check daily weights   -Strict Is and Os  -Check BUN and HCT daily goal is that both should go down  -IF BUN or HCT rise, please increase fluid      Problem 2-Transaminitis  normal caliber CBD  ddx r/o CBD stone, possibly DILI  Rec  -Avoid hepatotoxic medications  -to consider EUS vs MRI with and without contrast pancreatic protocol when pancreatitis improves  -will call [facility] for approval  -Monitor LFTs daily     Problem 3- Chip hepatis 1.5 cm lymph node and additional para-aortic and   paracaval lymph nodes, possibly reactive. Short-term follow-up with MRI   is recommended to exclude other etiologies.  Rec  - Care as per primary team  67yFemale pmh bipolar mood disorder on macrobid for UTI presents for mild epigastric pain and back pain. Currently patient feeling much better.      Problem 1-Acute pancreatitis 2/3 clinical picture and CT imaging show acute pancreatitis    Indistinct pancreatic head hypodensity, possibly due to mild focal   interstitial edematous pancreatitis. In addition, there are subtle   findings of right upper quadrant inflammatory change including mild   gallbladder wall thickening and circumferential duodenal thickening that   may be seen with duodenitis or secondary to pancreatic inflammation.  If patient's symptomatology persists, follow-up CT with intravenous   contrast is recommended to further evaluate the pancreas.  transaminitis   Rec  -Case discussed with [facility] team plan for EUS next week  -Monitor LFTs daily   -start clear liquid diet  -RUQ Abdominal Ultrasound appreciated   -Fasting Lipid Panel   -Aggressive IV hydration with lactated ringers, check daily weights   -Strict Is and Os  -Check BUN and HCT daily goal is that both should go down  -IF BUN or HCT rise, please increase fluid      Problem 2-Transaminitis  normal caliber CBD  ddx r/o CBD stone, possibly DILI  Rec  -Avoid hepatotoxic medications  -to consider EUS vs MRI with and without contrast pancreatic protocol when pancreatitis improves  -will call [facility] for approval  -Monitor LFTs daily     Problem 3- Chip hepatis 1.5 cm lymph node and additional para-aortic and   paracaval lymph nodes, possibly reactive. Short-term follow-up with MRI   is recommended to exclude other etiologies.  Rec  - Care as per primary team  2021

## 2022-09-23 NOTE — PROGRESS NOTE ADULT - SUBJECTIVE AND OBJECTIVE BOX
Patient is a 67y old  Female who presents with a chief complaint of urinary symptoms (23 Sep 2022 11:48)      INTERVAL HPI/OVERNIGHT EVENTS:    MEDICATIONS  (STANDING):  amoxicillin  875 milliGRAM(s)/clavulanate 1 Tablet(s) Oral two times a day  buPROPion XL (24-Hour) . 150 milliGRAM(s) Oral daily  haloperidol     Tablet 2 milliGRAM(s) Oral daily  heparin   Injectable 5000 Unit(s) SubCutaneous every 12 hours  pantoprazole  Injectable 40 milliGRAM(s) IV Push daily  sodium chloride 0.9%. 1000 milliLiter(s) (125 mL/Hr) IV Continuous <Continuous>    MEDICATIONS  (PRN):  LORazepam     Tablet 0.5 milliGRAM(s) Oral every 12 hours PRN Anxiety  morphine  - Injectable 4 milliGRAM(s) IV Push every 4 hours PRN Severe Pain (7 - 10)  zolpidem 5 milliGRAM(s) Oral at bedtime PRN Insomnia  zolpidem 5 milliGRAM(s) Oral at bedtime PRN Insomnia      Allergies    No Known Allergies    Intolerances        REVIEW OF SYSTEMS:  CONSTITUTIONAL: No fever, weight loss, or fatigue  EYES: No eye pain, visual disturbances, or discharge    NECK: No pain or stiffness  BREASTS: No pain, masses, or nipple discharge  RESPIRATORY: No cough, wheezing, chills or hemoptysis; No shortness of breath  CARDIOVASCULAR: No chest pain, palpitations, dizziness, or leg swelling  GASTROINTESTINAL: No abdominal or epigastric pain. No nausea, vomiting, or hematemesis; No diarrhea or constipation. No melena or hematochezia.    ALLERGY AND IMMUNOLOGIC: No hives or eczema    Vital Signs Last 24 Hrs  T(C): 37.1 (23 Sep 2022 14:02), Max: 37.2 (23 Sep 2022 02:21)  T(F): 98.8 (23 Sep 2022 14:02), Max: 99 (23 Sep 2022 02:21)  HR: 80 (23 Sep 2022 14:02) (78 - 98)  BP: 138/80 (23 Sep 2022 14:02) (113/68 - 153/83)  BP(mean): --  RR: 18 (23 Sep 2022 14:02) (18 - 20)  SpO2: 98% (23 Sep 2022 06:29) (97% - 98%)    Parameters below as of 23 Sep 2022 06:29  Patient On (Oxygen Delivery Method): room air        PHYSICAL EXAM:  GENERAL: NAD, well-groomed, well-developed  HEAD:  Atraumatic, Normocephalic  EYES: EOMI,  conjunctiva and sclera clear  NECK: Supple, No JVD, Normal thyroid  NERVOUS SYSTEM:  Alert & Oriented X3,   CHEST/LUNG: Clear to percussion bilaterally; No rales, rhonchi, wheezing, or rubs  HEART: Regular rate and rhythm; No murmurs, rubs, or gallops  ABDOMEN: Soft, Nontender, Nondistended; Bowel sounds present  EXTREMITIES:   No clubbing, cyanosis, or edema    SKIN: No rashes or lesions    LABS:                        11.3   6.14  )-----------( 283      ( 23 Sep 2022 06:22 )             35.1         138  |  102  |  13  ----------------------------<  92  3.9   |  24  |  1.3    Ca    9.6      23 Sep 2022 06:22    TPro  5.3<L>  /  Alb  3.4<L>  /  TBili  2.9<H>  /  DBili  2.6<H>  /  AST  46<H>  /  ALT  233<H>  /  AlkPhos  231<H>        Urinalysis Basic - ( 22 Sep 2022 16:54 )    Color: Yellow / Appearance: Clear / S.010 / pH: x  Gluc: x / Ketone: Negative  / Bili: Small / Urobili: 1.0 mg/dL   Blood: x / Protein: Negative mg/dL / Nitrite: Negative   Leuk Esterase: Small / RBC: 3-5 /HPF / WBC 6-10 /HPF   Sq Epi: x / Non Sq Epi: Moderate /HPF / Bacteria: Few      CAPILLARY BLOOD GLUCOSE          RADIOLOGY & ADDITIONAL TESTS:    Imaging Personally Reviewed:  [ ] YES  [ ] NO    Consultant(s) Notes Reviewed:  [ ] YES  [ ] NO    Care Discussed with Consultants/Other Providers [ ] YES  [ ] NO

## 2022-09-24 LAB
HAV IGM SER-ACNC: SIGNIFICANT CHANGE UP
HBV CORE IGM SER-ACNC: SIGNIFICANT CHANGE UP
HBV SURFACE AG SER-ACNC: SIGNIFICANT CHANGE UP
HCV AB S/CO SERPL IA: 0.15 S/CO — SIGNIFICANT CHANGE UP (ref 0–0.99)
HCV AB SERPL-IMP: SIGNIFICANT CHANGE UP

## 2022-09-24 PROCEDURE — 99223 1ST HOSP IP/OBS HIGH 75: CPT

## 2022-09-24 PROCEDURE — 93010 ELECTROCARDIOGRAM REPORT: CPT | Mod: 76

## 2022-09-24 PROCEDURE — 99222 1ST HOSP IP/OBS MODERATE 55: CPT

## 2022-09-24 PROCEDURE — 99233 SBSQ HOSP IP/OBS HIGH 50: CPT

## 2022-09-24 RX ORDER — ACETAMINOPHEN 500 MG
650 TABLET ORAL ONCE
Refills: 0 | Status: COMPLETED | OUTPATIENT
Start: 2022-09-24 | End: 2022-09-24

## 2022-09-24 RX ORDER — DILTIAZEM HCL 120 MG
20 CAPSULE, EXT RELEASE 24 HR ORAL ONCE
Refills: 0 | Status: COMPLETED | OUTPATIENT
Start: 2022-09-24 | End: 2022-09-24

## 2022-09-24 RX ORDER — POLYETHYLENE GLYCOL 3350 17 G/17G
17 POWDER, FOR SOLUTION ORAL ONCE
Refills: 0 | Status: COMPLETED | OUTPATIENT
Start: 2022-09-24 | End: 2022-09-24

## 2022-09-24 RX ORDER — METOPROLOL TARTRATE 50 MG
5 TABLET ORAL ONCE
Refills: 0 | Status: COMPLETED | OUTPATIENT
Start: 2022-09-24 | End: 2022-09-24

## 2022-09-24 RX ORDER — DILTIAZEM HCL 120 MG
10 CAPSULE, EXT RELEASE 24 HR ORAL ONCE
Refills: 0 | Status: COMPLETED | OUTPATIENT
Start: 2022-09-24 | End: 2022-09-24

## 2022-09-24 RX ORDER — AMIODARONE HYDROCHLORIDE 400 MG/1
1 TABLET ORAL
Qty: 900 | Refills: 0 | Status: DISCONTINUED | OUTPATIENT
Start: 2022-09-24 | End: 2022-09-28

## 2022-09-24 RX ORDER — DILTIAZEM HCL 120 MG
15 CAPSULE, EXT RELEASE 24 HR ORAL ONCE
Refills: 0 | Status: COMPLETED | OUTPATIENT
Start: 2022-09-24 | End: 2022-09-24

## 2022-09-24 RX ORDER — GUAIFENESIN/DEXTROMETHORPHAN 600MG-30MG
10 TABLET, EXTENDED RELEASE 12 HR ORAL EVERY 6 HOURS
Refills: 0 | Status: DISCONTINUED | OUTPATIENT
Start: 2022-09-24 | End: 2022-09-30

## 2022-09-24 RX ORDER — AMIODARONE HYDROCHLORIDE 400 MG/1
0.5 TABLET ORAL
Qty: 900 | Refills: 0 | Status: DISCONTINUED | OUTPATIENT
Start: 2022-09-24 | End: 2022-09-28

## 2022-09-24 RX ORDER — AMIODARONE HYDROCHLORIDE 400 MG/1
150 TABLET ORAL ONCE
Refills: 0 | Status: COMPLETED | OUTPATIENT
Start: 2022-09-24 | End: 2022-09-24

## 2022-09-24 RX ADMIN — HEPARIN SODIUM 5000 UNIT(S): 5000 INJECTION INTRAVENOUS; SUBCUTANEOUS at 17:31

## 2022-09-24 RX ADMIN — Medication 400 MILLIGRAM(S): at 14:42

## 2022-09-24 RX ADMIN — Medication 650 MILLIGRAM(S): at 04:30

## 2022-09-24 RX ADMIN — Medication 400 MILLIGRAM(S): at 17:14

## 2022-09-24 RX ADMIN — HALOPERIDOL DECANOATE 2 MILLIGRAM(S): 100 INJECTION INTRAMUSCULAR at 21:10

## 2022-09-24 RX ADMIN — Medication 1 TABLET(S): at 06:09

## 2022-09-24 RX ADMIN — AMIODARONE HYDROCHLORIDE 600 MILLIGRAM(S): 400 TABLET ORAL at 09:29

## 2022-09-24 RX ADMIN — BUPROPION HYDROCHLORIDE 150 MILLIGRAM(S): 150 TABLET, EXTENDED RELEASE ORAL at 11:28

## 2022-09-24 RX ADMIN — Medication 5 MILLIGRAM(S): at 04:30

## 2022-09-24 RX ADMIN — Medication 15 MILLIGRAM(S): at 05:58

## 2022-09-24 RX ADMIN — PANTOPRAZOLE SODIUM 40 MILLIGRAM(S): 20 TABLET, DELAYED RELEASE ORAL at 11:29

## 2022-09-24 RX ADMIN — Medication 20 MILLIGRAM(S): at 21:43

## 2022-09-24 RX ADMIN — Medication 10 MILLIGRAM(S): at 21:05

## 2022-09-24 RX ADMIN — HEPARIN SODIUM 5000 UNIT(S): 5000 INJECTION INTRAVENOUS; SUBCUTANEOUS at 06:09

## 2022-09-24 RX ADMIN — MORPHINE SULFATE 4 MILLIGRAM(S): 50 CAPSULE, EXTENDED RELEASE ORAL at 09:42

## 2022-09-24 RX ADMIN — MORPHINE SULFATE 4 MILLIGRAM(S): 50 CAPSULE, EXTENDED RELEASE ORAL at 10:37

## 2022-09-24 RX ADMIN — Medication 650 MILLIGRAM(S): at 05:00

## 2022-09-24 RX ADMIN — Medication 10 MILLILITER(S): at 15:24

## 2022-09-24 RX ADMIN — Medication 10 MILLILITER(S): at 06:59

## 2022-09-24 RX ADMIN — Medication 0.5 MILLIGRAM(S): at 08:47

## 2022-09-24 RX ADMIN — Medication 1 TABLET(S): at 17:31

## 2022-09-24 RX ADMIN — POLYETHYLENE GLYCOL 3350 17 GRAM(S): 17 POWDER, FOR SOLUTION ORAL at 21:44

## 2022-09-24 RX ADMIN — SODIUM CHLORIDE 125 MILLILITER(S): 9 INJECTION INTRAMUSCULAR; INTRAVENOUS; SUBCUTANEOUS at 21:43

## 2022-09-24 RX ADMIN — AMIODARONE HYDROCHLORIDE 33.3 MG/MIN: 400 TABLET ORAL at 09:42

## 2022-09-24 RX ADMIN — Medication 10 MILLILITER(S): at 21:09

## 2022-09-24 NOTE — CONSULT NOTE ADULT - SUBJECTIVE AND OBJECTIVE BOX
Patient is a 67y old  Female who presents with a chief complaint of urinary symptoms (24 Sep 2022 13:42)      HPI:  pt is a 68 yo f pmhx bipolar mood disorder presents to ER c/o urinary symptoms. pt states on Sunday she felt tired and having discomfort while urinating, presented to clinic and was sent home on nitrofurantion. Pt took antibiotics x 4 days with minimal relief. Presents today with low back pain, denies fever/chills. On lab work was found to have elevated lipase, elevated bilis.   (22 Sep 2022 22:55)    Called by hospitalist as pt continues to have episoded of ?svt and then bradycardia.  Cardiology is aware and the patient is hemodynamically stable and comfortable.    PAST MEDICAL & SURGICAL HISTORY:  Bipolar mood disorder      S/P tonsillectomy      H/O ovarian cystectomy        Allergies    No Known Allergies    Intolerances      FAMILY HISTORY:  FH: leukemia (Father)    FH: colon cancer (Mother)      Home Medications:  BUPROPION HCL  MG TABLET: TAKE ONE TABLET PER ORAL ROUTE ONCE A DAY (22 Sep 2022 22:25)  FLUCONAZOLE 150 MG TABLET:  (22 Sep 2022 22:25)  HALOPERIDOL 2 MG TABLET:  (22 Sep 2022 22:25)  LORAZEPAM 0.5 MG TABLET:  (22 Sep 2022 22:25)  OXCARBAZEPINE 150 MG TABLET:  (22 Sep 2022 22:25)  Trileptal 150 mg oral tablet: orally once a day (22 Sep 2022 22:25)  ZOLPIDEM TARTRATE 10 MG TABLET:  (22 Sep 2022 22:25)    Occupation:  Alochol: Denied  Smoking: Denied  Drug Use: Denied  Marital Status:         ROS: as in HPI; All other systems reviewed are negative    ICU Vital Signs Last 24 Hrs  T(C): 37.5 (24 Sep 2022 21:00), Max: 38 (24 Sep 2022 04:20)  T(F): 99.5 (24 Sep 2022 21:00), Max: 100.4 (24 Sep 2022 04:20)  HR: 55 (24 Sep 2022 21:11) (53 - 151)  BP: 101/63 (24 Sep 2022 21:11) (87/54 - 131/67)  BP(mean): 71 (24 Sep 2022 04:47) (71 - 71)  ABP: --  ABP(mean): --  RR: 18 (24 Sep 2022 21:00) (18 - 20)  SpO2: 96% (24 Sep 2022 19:12) (88% - 98%)    O2 Parameters below as of 24 Sep 2022 19:12  Patient On (Oxygen Delivery Method): room air              Physical Examination:    General: No acute distress.  Alert, oriented, interactive, nonfocal    HEENT: Pupils equal, reactive to light.  Symmetric.    PULM: Clear to auscultation bilaterally, no significant sputum production    CVS: Regular rate and rhythm, no murmurs, rubs, or gallops    ABD: Soft, nondistended, nontender, normoactive bowel sounds, no masses    EXT: No edema, nontender    SKIN: Warm and well perfused, no rashes noted.              I&O's Detail    23 Sep 2022 07:01  -  24 Sep 2022 07:00  --------------------------------------------------------  IN:  Total IN: 0 mL    OUT:    Indwelling Catheter - Urethral (mL): 1250 mL  Total OUT: 1250 mL    Total NET: -1250 mL      24 Sep 2022 07:01  -  24 Sep 2022 21:39  --------------------------------------------------------  IN:    Oral Fluid: 990 mL  Total IN: 990 mL    OUT:    Voided (mL): 1600 mL  Total OUT: 1600 mL    Total NET: -610 mL            LABS:                        11.3   6.14  )-----------( 283      ( 23 Sep 2022 06:22 )             35.1     23 Sep 2022 06:22    138    |  102    |  13     ----------------------------<  92     3.9     |  24     |  1.3      Ca    9.6        23 Sep 2022 06:22        CARDIAC MARKERS ( 23 Sep 2022 21:20 )  x     / <0.01 ng/mL / 35 U/L / x     / 1.1 ng/mL  CARDIAC MARKERS ( 23 Sep 2022 15:38 )  x     / <0.01 ng/mL / 36 U/L / x     / 1.4 ng/mL      CAPILLARY BLOOD GLUCOSE            Culture        MEDICATIONS  (STANDING):  aMIOdarone Infusion 1 mG/Min (33.3 mL/Hr) IV Continuous <Continuous>  aMIOdarone Infusion 0.5 mG/Min (16.7 mL/Hr) IV Continuous <Continuous>  amoxicillin  875 milliGRAM(s)/clavulanate 1 Tablet(s) Oral two times a day  buPROPion XL (24-Hour) . 150 milliGRAM(s) Oral daily  diltiazem Injectable 20 milliGRAM(s) IV Push once  haloperidol     Tablet 2 milliGRAM(s) Oral daily  heparin   Injectable 5000 Unit(s) SubCutaneous every 12 hours  pantoprazole  Injectable 40 milliGRAM(s) IV Push daily  polyethylene glycol 3350 17 Gram(s) Oral once  sodium chloride 0.9%. 1000 milliLiter(s) (125 mL/Hr) IV Continuous <Continuous>    MEDICATIONS  (PRN):  guaifenesin/dextromethorphan Oral Liquid 10 milliLiter(s) Oral every 6 hours PRN Cough  ibuprofen  Tablet. 400 milliGRAM(s) Oral four times a day PRN Mild Pain (1 - 3)  LORazepam     Tablet 0.5 milliGRAM(s) Oral every 12 hours PRN Anxiety  morphine  - Injectable 4 milliGRAM(s) IV Push every 4 hours PRN Severe Pain (7 - 10)  zolpidem 5 milliGRAM(s) Oral at bedtime PRN Insomnia  zolpidem 5 milliGRAM(s) Oral at bedtime PRN Insomnia        RADIOLOGY: ***     CXR:  TLC:  OG:  ET tube:          ECHO:

## 2022-09-24 NOTE — CONSULT NOTE ADULT - SUBJECTIVE AND OBJECTIVE BOX
CARDIOLOGY CONSULT NOTE     CHIEF COMPLAINT/REASON FOR CONSULT:    HPI:  pt is a 68 yo f pmhx bipolar mood disorder presents to ER c/o urinary symptoms. pt states on Sunday she felt tired and having discomfort while urinating, presented to clinic and was sent home on nitrofurantion. Pt took antibiotics x 4 days with minimal relief. Presents today with low back pain, denies fever/chills. On lab work was found to have elevated lipase, elevated bilis.   (22 Sep 2022 22:55)      PAST MEDICAL & SURGICAL HISTORY:  Bipolar mood disorder      S/P tonsillectomy      H/O ovarian cystectomy          Cardiac Risks:   [ ]HTN, [ ] DM, [ ] Smoking, [ ] FH,  [ ] Lipids        MEDICATIONS:  MEDICATIONS  (STANDING):  aMIOdarone Infusion 1 mG/Min (33.3 mL/Hr) IV Continuous <Continuous>  aMIOdarone Infusion 0.5 mG/Min (16.7 mL/Hr) IV Continuous <Continuous>  aMIOdarone IVPB 150 milliGRAM(s) IV Intermittent once  amoxicillin  875 milliGRAM(s)/clavulanate 1 Tablet(s) Oral two times a day  buPROPion XL (24-Hour) . 150 milliGRAM(s) Oral daily  haloperidol     Tablet 2 milliGRAM(s) Oral daily  heparin   Injectable 5000 Unit(s) SubCutaneous every 12 hours  pantoprazole  Injectable 40 milliGRAM(s) IV Push daily  sodium chloride 0.9%. 1000 milliLiter(s) (125 mL/Hr) IV Continuous <Continuous>      FAMILY HISTORY:  FH: leukemia (Father)    FH: colon cancer (Mother)        SOCIAL HISTORY:      [ ] Marital status    Allergies    No Known Allergies        	    REVIEW OF SYSTEMS:  CONSTITUTIONAL: No fever, weight loss, or fatigue  EYES: No eye pain, visual disturbances, or discharge  ENMT:  No difficulty hearing, tinnitus, vertigo; No sinus or throat pain  NECK: No pain or stiffness  RESPIRATORY: No cough, wheezing, chills or hemoptysis; No Shortness of Breath  CARDIOVASCULAR: No chest pain, palpitations, passing out, dizziness, or leg swelling  GASTROINTESTINAL: No abdominal or epigastric pain. No nausea, vomiting, or hematemesis; No diarrhea or constipation. No melena or hematochezia.  GENITOURINARY: No dysuria, frequency, hematuria, or incontinence  NEUROLOGICAL: No headaches, memory loss, loss of strength, numbness, or tremors  SKIN: No itching, burning, rashes, or lesions   	    [    PHYSICAL EXAM:  T(C): 36.7 (09-24-22 @ 05:35), Max: 38 (09-24-22 @ 04:20)  HR: 61 (09-24-22 @ 06:00) (61 - 151)  BP: 116/80 (09-24-22 @ 05:35) (95/56 - 138/80)  RR: 20 (09-24-22 @ 05:00) (18 - 20)  SpO2: 96% (09-24-22 @ 06:00) (88% - 98%)  Wt(kg): --  I&O's Summary    23 Sep 2022 07:01  -  24 Sep 2022 07:00  --------------------------------------------------------  IN: 0 mL / OUT: 1250 mL / NET: -1250 mL        Appearance: Normal	  Psychiatry: A & O x 3, Mood & affect appropriate  HEENT:   Normal oral mucosa, PERRL, EOMI	  Lymphatic: No lymphadenopathy  Cardiovascular: Normal S1 S2,RRR, No JVD, No murmurs  Respiratory: Lungs clear to auscultation	  Gastrointestinal:  Soft, Non-tender, + BS	  Skin: No rashes, No ecchymoses, No cyanosis	  Neurologic: Non-focal  Extremities: Normal range of motion, No clubbing, cyanosis or edema  Vascular: Peripheral pulses palpable 2+ bilaterally      ECG:  	  < from: 12 Lead ECG (09.23.22 @ 10:55) >  s    Diagnosis Line Normal sinus rhythm  Rightward axis  Borderline ECG    Confirmed by MONTRELL ROJAS MD ([id number]) on 9/23/2022 4:24:22 PM    < end of copied text >    	  LABS:	 	    CARDIAC MARKERS:                                    11.3   6.14  )-----------( 283      ( 23 Sep 2022 06:22 )             35.1     09-23    138  |  102  |  13  ----------------------------<  92  3.9   |  24  |  1.3    Ca    9.6      23 Sep 2022 06:22    TPro  5.3<L>  /  Alb  3.4<L>  /  TBili  2.9<H>  /  DBili  2.6<H>  /  AST  46<H>  /  ALT  233<H>  /  AlkPhos  231<H>  09-23

## 2022-09-24 NOTE — PROVIDER CONTACT NOTE (CHANGE IN STATUS NOTIFICATION) - ASSESSMENT
patient sleeping, RN awakened patient for assessment, patient reported no c/o cp, sob, or pain. /80, , sat 88% on RA, temp 100.4

## 2022-09-24 NOTE — CHART NOTE - NSCHARTNOTEFT_GEN_A_CORE
-Was alerted by primary RN that Pt's was tachyarrhythmic. Upon reviewing the ecg strips from monitor noted 's-130's. On telemonitor currently sustained 130's HR. Evaluated Pt at bedside, and asymptomatic, denies any CP, palpitations, SOB or dizziness/lightheadedness. Stat ECG at bedside showed accelerated junctional rhythm.    Plan:  -Will give Amiodarone loading dose followed by Amiodarone gtt x24hrs to try convert to NSR  -Repeat ECG in am  -Monitor lytes

## 2022-09-24 NOTE — CONSULT NOTE ADULT - ASSESSMENT
t is a 66 yo f pmhx bipolar mood disorder presents to ER c/o urinary symptoms. pt states on Sunday she felt tired and having discomfort while urinating, presented to clinic and was sent home on nitrofurantion. Pt took antibiotics x 4 days with minimal relief.This am she had SVT rate 135 BP. She had no symptoms. She does not know if she ssnores, She has rare dreams, Possible DEANN, Will give iv Amiodarone. Probably no po, Out patient holter 1- 2 weeks. Echo can do out patient. Consider sleep study, Rx UTI

## 2022-09-24 NOTE — CHART NOTE - NSCHARTNOTEFT_GEN_A_CORE
Was alerted pt had suprapubic discomfort, luz placed, drained 1250ml of urine. Pt had SVT on the monitor @133 BPM, /80. Cardizem 15mg IVP x1 given, pt's rhythm reverted now HR60 NSR. Cardio consult pending, f/u electrolytes. Pt denies CP/ palpitations saturating well on RA, currently asymptomatic.

## 2022-09-24 NOTE — CHART NOTE - NSCHARTNOTEFT_GEN_A_CORE
Pt's appears to be in SVT @ ~130's- 150's, Cardizem 10mg IVP ordered, will monitor vitals. Pt's appears to be in SVT @ ~130's- 150's, Cardizem 10mg IVP ordered, will monitor vitals.    Update: pt's HR goes in and out of SVT, also observed periods of bradycardia on EKG with sinus pauses. /67 Critical care consulted, will f/u recommendations. Pt's appears to be in SVT @ ~130's- 150's, Cardizem 10mg IVP ordered, will monitor vitals. Pt was examined at bedside, pt is asymptomatic.     Update: pt's HR goes in and out of SVT, also observed periods of bradycardia on EKG with sinus pauses. /67 Critical care consulted, will f/u recommendations. Pt's appears to be in SVT @ ~130's- 150's, Cardizem 10mg IVP ordered, will monitor vitals. Pt was examined at bedside, pt is asymptomatic.     Update: pt's HR goes in and out of SVT, also observed periods of bradycardia on EKG with sinus pauses. /67 Critical care consulted, recommended cardizem 20mg IVP x1, will monitor VS.

## 2022-09-24 NOTE — PROGRESS NOTE ADULT - SUBJECTIVE AND OBJECTIVE BOX
Patient is a 67y old  Female who presents with a chief complaint of urinary symptoms (24 Sep 2022 09:23)      INTERVAL HPI/OVERNIGHT EVENTS:    MEDICATIONS  (STANDING):  aMIOdarone Infusion 1 mG/Min (33.3 mL/Hr) IV Continuous <Continuous>  aMIOdarone Infusion 0.5 mG/Min (16.7 mL/Hr) IV Continuous <Continuous>  amoxicillin  875 milliGRAM(s)/clavulanate 1 Tablet(s) Oral two times a day  buPROPion XL (24-Hour) . 150 milliGRAM(s) Oral daily  haloperidol     Tablet 2 milliGRAM(s) Oral daily  heparin   Injectable 5000 Unit(s) SubCutaneous every 12 hours  pantoprazole  Injectable 40 milliGRAM(s) IV Push daily  sodium chloride 0.9%. 1000 milliLiter(s) (125 mL/Hr) IV Continuous <Continuous>    MEDICATIONS  (PRN):  guaifenesin/dextromethorphan Oral Liquid 10 milliLiter(s) Oral every 6 hours PRN Cough  ibuprofen  Tablet. 400 milliGRAM(s) Oral four times a day PRN Mild Pain (1 - 3)  LORazepam     Tablet 0.5 milliGRAM(s) Oral every 12 hours PRN Anxiety  morphine  - Injectable 4 milliGRAM(s) IV Push every 4 hours PRN Severe Pain (7 - 10)  zolpidem 5 milliGRAM(s) Oral at bedtime PRN Insomnia  zolpidem 5 milliGRAM(s) Oral at bedtime PRN Insomnia      Allergies    No Known Allergies    Intolerances        REVIEW OF SYSTEMS:  CONSTITUTIONAL: No fever, weight loss, or fatigue  EYES: No eye pain, visual disturbances, or discharge  ENMT:  No difficulty hearing, tinnitus, vertigo; No sinus or throat pain  NECK: No pain or stiffness  BREASTS: No pain, masses, or nipple discharge  RESPIRATORY: No cough, wheezing, chills or hemoptysis; No shortness of breath  CARDIOVASCULAR: No chest pain, palpitations, dizziness, or leg swelling  GASTROINTESTINAL: No abdominal or epigastric pain. No nausea, vomiting, or hematemesis; No diarrhea or constipation. No melena or hematochezia.  GENITOURINARY: No dysuria, frequency, hematuria, or incontinence  NEUROLOGICAL: No headaches, memory loss, loss of strength, numbness, or tremors  SKIN: No itching, burning, rashes, or lesions   LYMPH NODES: No enlarged glands  ENDOCRINE: No heat or cold intolerance; No hair loss  MUSCULOSKELETAL: No joint pain or swelling; No muscle, back, or extremity pain  PSYCHIATRIC: No depression, anxiety, mood swings, or difficulty sleeping  HEME/LYMPH: No easy bruising, or bleeding gums  ALLERGY AND IMMUNOLOGIC: No hives or eczema    Vital Signs Last 24 Hrs  T(C): 36.1 (24 Sep 2022 13:24), Max: 38 (24 Sep 2022 04:20)  T(F): 96.9 (24 Sep 2022 13:24), Max: 100.4 (24 Sep 2022 04:20)  HR: 53 (24 Sep 2022 13:24) (53 - 151)  BP: 87/54 (24 Sep 2022 13:24) (87/54 - 138/80)  BP(mean): 71 (24 Sep 2022 04:47) (71 - 71)  RR: 18 (24 Sep 2022 13:24) (18 - 20)  SpO2: 96% (24 Sep 2022 06:00) (88% - 98%)    Parameters below as of 24 Sep 2022 06:00  Patient On (Oxygen Delivery Method): nasal cannula  O2 Flow (L/min): 3      PHYSICAL EXAM:  GENERAL: NAD, well-groomed, well-developed  HEAD:  Atraumatic, Normocephalic  EYES: EOMI, PERRLA, conjunctiva and sclera clear  ENMT: No tonsillar erythema, exudates, or enlargement; Moist mucous membranes, Good dentition, No lesions  NECK: Supple, No JVD, Normal thyroid  NERVOUS SYSTEM:  Alert & Oriented X3, Good concentration; Motor Strength 5/5 B/L upper and lower extremities; DTRs 2+ intact and symmetric  CHEST/LUNG: Clear to percussion bilaterally; No rales, rhonchi, wheezing, or rubs  HEART: Regular rate and rhythm; No murmurs, rubs, or gallops  ABDOMEN: Soft, Nontender, Nondistended; Bowel sounds present  EXTREMITIES:  2+ Peripheral Pulses, No clubbing, cyanosis, or edema  LYMPH: No lymphadenopathy noted  SKIN: No rashes or lesions    LABS:                        11.3   6.14  )-----------( 283      ( 23 Sep 2022 06:22 )             35.1     09-23    138  |  102  |  13  ----------------------------<  92  3.9   |  24  |  1.3    Ca    9.6      23 Sep 2022 06:22    TPro  5.3<L>  /  Alb  3.4<L>  /  TBili  2.9<H>  /  DBili  2.6<H>  /  AST  46<H>  /  ALT  233<H>  /  AlkPhos  231<H>  09-      Urinalysis Basic - ( 22 Sep 2022 16:54 )    Color: Yellow / Appearance: Clear / S.010 / pH: x  Gluc: x / Ketone: Negative  / Bili: Small / Urobili: 1.0 mg/dL   Blood: x / Protein: Negative mg/dL / Nitrite: Negative   Leuk Esterase: Small / RBC: 3-5 /HPF / WBC 6-10 /HPF   Sq Epi: x / Non Sq Epi: Moderate /HPF / Bacteria: Few      CAPILLARY BLOOD GLUCOSE          RADIOLOGY & ADDITIONAL TESTS:    Imaging Personally Reviewed:  [ ] YES  [ ] NO    Consultant(s) Notes Reviewed:  [ ] YES  [ ] NO    Care Discussed with Consultants/Other Providers [ ] YES  [ ] NO

## 2022-09-24 NOTE — PROGRESS NOTE ADULT - ASSESSMENT
68 yo f pmhx bipolar mood disorder presents to ER c/o urinary symptoms s/p recent UTI p/w pancreatitis, ?duodenitis and transaminitis     CLEAR LIQUID DIET STARTED   Indistinct pancreatic head hypodensity, possibly due to mild focal   interstitial edematous pancreatitis. In addition, there are subtle   findings of right upper quadrant inflammatory change including mild   gallbladder wall thickening and circumferential duodenal thickening that   may be seen with duodenitis or secondary to pancreatic inflammation.  If patient's symptomatology persists, follow-up CT with intravenous   contrast is recommended to further evaluate the pancreas.  transaminitis    transaminitis   - IVF resuscitation   - bowel rest   - pain control   - GI consult   - abdominal US unremarkable   - duodenitis on CT   - f/u labs, including hepatic panel, hepatitis panel, igg4, lipase   - trend lipase  d/c macrobid  UTI START AUGMENTIN 875MG PO Q 12HRS  #recent UTI   - f/u UA/urine culture     mildly elevated triglycerides  (Trig 189 - unlikely cause of pancreatitis)  normal caliber CBD  ddx r/o CBD stone, possibly DILI  Rec  -Avoid hepatotoxic medications  -to consider EUS vs MRI with and without contrast pancreatic protocol when pancreatitis improves  -will call [facility] for approval  -Monitor LFTs daily   a 68 yo f pmhx bipolar mood disorder presents to ER c/o urinary symptoms. pt states on Sunday she felt tired and having discomfort while urinating, presented to clinic and was sent home on nitrofurantion. Pt took antibiotics x 4 days with minimal relief.This am she had SVT rate 135 BP. She had no symptoms. She does not know if she ssnores, She has rare dreams, Possible DEANN, Will give iv Amiodarone. Probably no po, Out patient holter 1- 2 weeks. Echo can do out patient. Consider sleep study, Rx UTI

## 2022-09-24 NOTE — CHART NOTE - NSCHARTNOTEFT_GEN_A_CORE
-Was alerted by Primary RN that Pt' heart low in the 50's. Evaluated Pt at bedside and asymptomatic. Pt noted to be hypotensive SBP 85 and HR 52. Appears in sinus rhythm on monitor. Will stop Amiodarone infusion and continue to monitor.

## 2022-09-24 NOTE — CONSULT NOTE ADULT - ASSESSMENT
IMPRESSION/PLAN:  arrhythmia  tachy/ansley    Pt comfortable and in no distress.  At this time would continue cardizem IVP Prn for HR >125.  send serial ce.  Cardio follow up in am.  consider ep eval.  Reconsult ICU prn.    CNS: continue bipolar meds    HEENT: clear    PULMONARY: supplemental O2 prn    CARDIOVASCULAR: cardio f/u, cardizem prn. repeat ekg in am, check tft's, consider ep eval.    GI: GI prophylaxis.  Feeding     RENAL: monitor UO    INFECTIOUS DISEASE: continue current abx    HEMATOLOGICAL:  DVT prophylaxis.    ENDOCRINE:  Follow up FS.  Insulin protocol if needed.    MUSCULOSKELETAL: oob to chair        CRITICAL CARE TIME SPENT: 35 minutes

## 2022-09-25 PROCEDURE — 99233 SBSQ HOSP IP/OBS HIGH 50: CPT

## 2022-09-25 RX ORDER — DILTIAZEM HCL 120 MG
20 CAPSULE, EXT RELEASE 24 HR ORAL ONCE
Refills: 0 | Status: COMPLETED | OUTPATIENT
Start: 2022-09-25 | End: 2022-09-25

## 2022-09-25 RX ORDER — DILTIAZEM HCL 120 MG
5 CAPSULE, EXT RELEASE 24 HR ORAL
Qty: 125 | Refills: 0 | Status: DISCONTINUED | OUTPATIENT
Start: 2022-09-25 | End: 2022-09-26

## 2022-09-25 RX ORDER — AMIODARONE HYDROCHLORIDE 400 MG/1
150 TABLET ORAL ONCE
Refills: 0 | Status: COMPLETED | OUTPATIENT
Start: 2022-09-25 | End: 2022-09-25

## 2022-09-25 RX ORDER — DILTIAZEM HCL 120 MG
10 CAPSULE, EXT RELEASE 24 HR ORAL ONCE
Refills: 0 | Status: COMPLETED | OUTPATIENT
Start: 2022-09-25 | End: 2022-09-25

## 2022-09-25 RX ADMIN — AMIODARONE HYDROCHLORIDE 600 MILLIGRAM(S): 400 TABLET ORAL at 17:36

## 2022-09-25 RX ADMIN — Medication 5 MG/HR: at 21:35

## 2022-09-25 RX ADMIN — HEPARIN SODIUM 5000 UNIT(S): 5000 INJECTION INTRAVENOUS; SUBCUTANEOUS at 05:45

## 2022-09-25 RX ADMIN — PANTOPRAZOLE SODIUM 40 MILLIGRAM(S): 20 TABLET, DELAYED RELEASE ORAL at 11:40

## 2022-09-25 RX ADMIN — ZOLPIDEM TARTRATE 5 MILLIGRAM(S): 10 TABLET ORAL at 21:36

## 2022-09-25 RX ADMIN — Medication 1 TABLET(S): at 05:45

## 2022-09-25 RX ADMIN — BUPROPION HYDROCHLORIDE 150 MILLIGRAM(S): 150 TABLET, EXTENDED RELEASE ORAL at 11:40

## 2022-09-25 RX ADMIN — HALOPERIDOL DECANOATE 2 MILLIGRAM(S): 100 INJECTION INTRAMUSCULAR at 21:35

## 2022-09-25 RX ADMIN — Medication 1 TABLET(S): at 18:24

## 2022-09-25 RX ADMIN — Medication 20 MILLIGRAM(S): at 16:34

## 2022-09-25 RX ADMIN — Medication 10 MILLIGRAM(S): at 15:43

## 2022-09-25 RX ADMIN — Medication 10 MILLILITER(S): at 21:35

## 2022-09-25 RX ADMIN — Medication 0.5 MILLIGRAM(S): at 21:35

## 2022-09-25 RX ADMIN — Medication 5 MG/HR: at 17:19

## 2022-09-25 RX ADMIN — ZOLPIDEM TARTRATE 5 MILLIGRAM(S): 10 TABLET ORAL at 00:12

## 2022-09-25 NOTE — PROGRESS NOTE ADULT - ASSESSMENT
Patient with periods slow svt. She has pancreatitis Bp was low. Better with fluid. May need out patient holter or MCOT

## 2022-09-25 NOTE — CHART NOTE - NSCHARTNOTEFT_GEN_A_CORE
Called by RN for sustained -160; patient asymptomatic.  /86, will give Cardizem 10mg IVP x 1 now.  d/w medicine attending.

## 2022-09-25 NOTE — CHART NOTE - NSCHARTNOTEFT_GEN_A_CORE
ICU MD Dickerson was called by hospitalist for pt w/ afib RVR. pt was started on iv pb cardizem  . Pt examined w/ icu MD. Pt's hr 73 in afib .   additionally IV amoiodarone is also pending to be given.  Pt is followed by cardiology as she also may need oral AC.  Presently HR is controlled bp 117/76.  No need for ICU admission. ICU MD Dickerson was called by hospitalist for pt w/ afib RVR. pt was started on ivpb cardizem  . Pt examined w/ icu MD. Pt's hr 73 in afib .   additionally IV amoiodarone is also pending to be given.  Pt is followed by cardiology as she also may need oral AC.  Presently HR is controlled bp 117/76.  No need for ICU admission.

## 2022-09-25 NOTE — PROGRESS NOTE ADULT - SUBJECTIVE AND OBJECTIVE BOX
Patient is a 67y old  Female who presents with a chief complaint of urinary symptoms (25 Sep 2022 08:42)      INTERVAL HPI/OVERNIGHT EVENTS:    MEDICATIONS  (STANDING):  aMIOdarone Infusion 1 mG/Min (33.3 mL/Hr) IV Continuous <Continuous>  aMIOdarone Infusion 0.5 mG/Min (16.7 mL/Hr) IV Continuous <Continuous>  amoxicillin  875 milliGRAM(s)/clavulanate 1 Tablet(s) Oral two times a day  buPROPion XL (24-Hour) . 150 milliGRAM(s) Oral daily  haloperidol     Tablet 2 milliGRAM(s) Oral daily  heparin   Injectable 5000 Unit(s) SubCutaneous every 12 hours  pantoprazole  Injectable 40 milliGRAM(s) IV Push daily  sodium chloride 0.9%. 1000 milliLiter(s) (125 mL/Hr) IV Continuous <Continuous>    MEDICATIONS  (PRN):  guaifenesin/dextromethorphan Oral Liquid 10 milliLiter(s) Oral every 6 hours PRN Cough  ibuprofen  Tablet. 400 milliGRAM(s) Oral four times a day PRN Mild Pain (1 - 3)  LORazepam     Tablet 0.5 milliGRAM(s) Oral every 12 hours PRN Anxiety  morphine  - Injectable 4 milliGRAM(s) IV Push every 4 hours PRN Severe Pain (7 - 10)  zolpidem 5 milliGRAM(s) Oral at bedtime PRN Insomnia  zolpidem 5 milliGRAM(s) Oral at bedtime PRN Insomnia      Allergies    No Known Allergies    Intolerances        REVIEW OF SYSTEMS:  CONSTITUTIONAL: No fever,  EYES: No eye pain, visual disturbances, or discharge  NECK: No pain or stiffness  RESPIRATORY: No cough, wheezing, chills or hemoptysis; No shortness of breath  CARDIOVASCULAR: No chest pain, palpitations, dizziness, or leg swelling  GASTROINTESTINAL: No abdominal or epigastric pain. No nausea, vomiting, or hematemesis; No diarrhea or constipation. No melena or hematochezia.  ALLERGY AND IMMUNOLOGIC: No hives or eczema    Vital Signs Last 24 Hrs  T(C): 36.8 (25 Sep 2022 13:20), Max: 37.5 (24 Sep 2022 21:00)  T(F): 98.2 (25 Sep 2022 13:20), Max: 99.5 (24 Sep 2022 21:00)  HR: 66 (25 Sep 2022 13:20) (55 - 146)  BP: 138/77 (25 Sep 2022 13:20) (89/55 - 138/77)  BP(mean): --  RR: 16 (25 Sep 2022 13:20) (16 - 18)  SpO2: 96% (24 Sep 2022 19:12) (96% - 98%)    Parameters below as of 24 Sep 2022 19:12  Patient On (Oxygen Delivery Method): room air        PHYSICAL EXAM:  GENERAL: NAD, well-groomed, well-developed  HEAD:  Atraumatic, Normocephalic  EYES: EOMI, conjunctiva and sclera clear  NECK: Supple, No JVD, Normal thyroid  NERVOUS SYSTEM:  Alert & Oriented X3, Good concentration;  CHEST/LUNG: Clear to percussion bilaterally; No rales, rhonchi, wheezing, or rubs  HEART: Regular rate and rhythm; No murmurs, rubs, or gallops  ABDOMEN: Soft, Nontender, Nondistended; Bowel sounds present  SKIN: No rashes or lesions    LABS:              CAPILLARY BLOOD GLUCOSE          RADIOLOGY & ADDITIONAL TESTS:    Imaging Personally Reviewed:  [ ] YES  [ ] NO    Consultant(s) Notes Reviewed:  [ ] YES  [ ] NO    Care Discussed with Consultants/Other Providers [ ] YES  [ ] NO

## 2022-09-26 LAB
IGG SERPL-MCNC: 494 MG/DL — LOW (ref 586–1602)
IGG1 SER-MCNC: 333 MG/DL — SIGNIFICANT CHANGE UP (ref 248–810)
IGG2 SER-MCNC: 35 MG/DL — LOW (ref 130–555)
IGG3 SER-MCNC: 9 MG/DL — LOW (ref 15–102)
IGG4 SER-MCNC: 21 MG/DL — SIGNIFICANT CHANGE UP (ref 2–96)

## 2022-09-26 PROCEDURE — 93010 ELECTROCARDIOGRAM REPORT: CPT

## 2022-09-26 PROCEDURE — 93306 TTE W/DOPPLER COMPLETE: CPT | Mod: 26

## 2022-09-26 PROCEDURE — 99233 SBSQ HOSP IP/OBS HIGH 50: CPT

## 2022-09-26 PROCEDURE — 99231 SBSQ HOSP IP/OBS SF/LOW 25: CPT

## 2022-09-26 PROCEDURE — 99232 SBSQ HOSP IP/OBS MODERATE 35: CPT

## 2022-09-26 RX ORDER — DILTIAZEM HCL 120 MG
120 CAPSULE, EXT RELEASE 24 HR ORAL DAILY
Refills: 0 | Status: DISCONTINUED | OUTPATIENT
Start: 2022-09-26 | End: 2022-09-30

## 2022-09-26 RX ORDER — FLUTICASONE PROPIONATE 50 MCG
1 SPRAY, SUSPENSION NASAL
Refills: 0 | Status: DISCONTINUED | OUTPATIENT
Start: 2022-09-26 | End: 2022-09-30

## 2022-09-26 RX ORDER — DILTIAZEM HCL 120 MG
7.5 CAPSULE, EXT RELEASE 24 HR ORAL
Qty: 125 | Refills: 0 | Status: DISCONTINUED | OUTPATIENT
Start: 2022-09-26 | End: 2022-09-26

## 2022-09-26 RX ORDER — OXCARBAZEPINE 300 MG/1
150 TABLET, FILM COATED ORAL ONCE
Refills: 0 | Status: COMPLETED | OUTPATIENT
Start: 2022-09-26 | End: 2022-09-26

## 2022-09-26 RX ORDER — OXCARBAZEPINE 300 MG/1
150 TABLET, FILM COATED ORAL
Refills: 0 | Status: DISCONTINUED | OUTPATIENT
Start: 2022-09-26 | End: 2022-09-30

## 2022-09-26 RX ADMIN — Medication 1 TABLET(S): at 18:02

## 2022-09-26 RX ADMIN — Medication 0.5 MILLIGRAM(S): at 12:30

## 2022-09-26 RX ADMIN — OXCARBAZEPINE 150 MILLIGRAM(S): 300 TABLET, FILM COATED ORAL at 18:03

## 2022-09-26 RX ADMIN — PANTOPRAZOLE SODIUM 40 MILLIGRAM(S): 20 TABLET, DELAYED RELEASE ORAL at 12:31

## 2022-09-26 RX ADMIN — Medication 5 MG/HR: at 09:50

## 2022-09-26 RX ADMIN — Medication 1 TABLET(S): at 06:09

## 2022-09-26 RX ADMIN — Medication 120 MILLIGRAM(S): at 18:02

## 2022-09-26 RX ADMIN — ZOLPIDEM TARTRATE 5 MILLIGRAM(S): 10 TABLET ORAL at 22:40

## 2022-09-26 RX ADMIN — BUPROPION HYDROCHLORIDE 150 MILLIGRAM(S): 150 TABLET, EXTENDED RELEASE ORAL at 12:31

## 2022-09-26 RX ADMIN — Medication 1 SPRAY(S): at 12:33

## 2022-09-26 RX ADMIN — OXCARBAZEPINE 150 MILLIGRAM(S): 300 TABLET, FILM COATED ORAL at 12:31

## 2022-09-26 RX ADMIN — Medication 7.5 MG/HR: at 13:30

## 2022-09-26 RX ADMIN — HALOPERIDOL DECANOATE 2 MILLIGRAM(S): 100 INJECTION INTRAMUSCULAR at 21:03

## 2022-09-26 RX ADMIN — SODIUM CHLORIDE 125 MILLILITER(S): 9 INJECTION INTRAMUSCULAR; INTRAVENOUS; SUBCUTANEOUS at 18:01

## 2022-09-26 NOTE — PROGRESS NOTE ADULT - SUBJECTIVE AND OBJECTIVE BOX
Patient is a 67y old  Female who presents with a chief complaint of urinary symptoms (25 Sep 2022 13:26)      INTERVAL HPI/OVERNIGHT EVENTS:    MEDICATIONS  (STANDING):  aMIOdarone Infusion 1 mG/Min (33.3 mL/Hr) IV Continuous <Continuous>  aMIOdarone Infusion 0.5 mG/Min (16.7 mL/Hr) IV Continuous <Continuous>  amoxicillin  875 milliGRAM(s)/clavulanate 1 Tablet(s) Oral two times a day  buPROPion XL (24-Hour) . 150 milliGRAM(s) Oral daily  diltiazem Infusion 5 mG/Hr (5 mL/Hr) IV Continuous <Continuous>  haloperidol     Tablet 2 milliGRAM(s) Oral daily  heparin   Injectable 5000 Unit(s) SubCutaneous every 12 hours  OXcarbazepine 150 milliGRAM(s) Oral two times a day  pantoprazole  Injectable 40 milliGRAM(s) IV Push daily  sodium chloride 0.9%. 1000 milliLiter(s) (125 mL/Hr) IV Continuous <Continuous>    MEDICATIONS  (PRN):  fluticasone propionate 50 MICROgram(s)/spray Nasal Spray 1 Spray(s) Both Nostrils two times a day PRN nasal congestion  guaifenesin/dextromethorphan Oral Liquid 10 milliLiter(s) Oral every 6 hours PRN Cough  ibuprofen  Tablet. 400 milliGRAM(s) Oral four times a day PRN Mild Pain (1 - 3)  LORazepam     Tablet 0.5 milliGRAM(s) Oral every 12 hours PRN Anxiety  morphine  - Injectable 4 milliGRAM(s) IV Push every 4 hours PRN Severe Pain (7 - 10)  zolpidem 5 milliGRAM(s) Oral at bedtime PRN Insomnia  zolpidem 5 milliGRAM(s) Oral at bedtime PRN Insomnia      Allergies    No Known Allergies    Intolerances        REVIEW OF SYSTEMS:  CONSTITUTIONAL: No fever,   EYES: No eye pain, visual disturbances, or discharge  NECK: No pain or stiffness  RESPIRATORY: No cough, wheezing, chills or hemoptysis; No shortness of breath  CARDIOVASCULAR: No chest pain, palpitations, dizziness, or leg swelling  GASTROINTESTINAL: No abdominal or epigastric pain. No nausea, vomiting, or hematemesis; No diarrhea or constipation. No melena or hematochezia.  NEUROLOGICAL: No headaches, memory loss, loss of strength, numbness, or tremors  SKIN: No itching, burning, rashes, or lesions   ALLERGY AND IMMUNOLOGIC: No hives or eczema    Vital Signs Last 24 Hrs  T(C): 36.9 (26 Sep 2022 05:30), Max: 36.9 (25 Sep 2022 17:00)  T(F): 98.5 (26 Sep 2022 05:30), Max: 98.5 (26 Sep 2022 05:30)  HR: 130 (26 Sep 2022 07:30) (62 - 158)  BP: 133/76 (26 Sep 2022 05:30) (117/86 - 138/77)  BP(mean): --  RR: 17 (26 Sep 2022 07:30) (16 - 18)  SpO2: 96% (26 Sep 2022 07:30) (96% - 96%)    Parameters below as of 26 Sep 2022 07:30  Patient On (Oxygen Delivery Method): nasal cannula        PHYSICAL EXAM:  GENERAL: NAD, well-groomed, well-developed  HEAD:  Atraumatic, Normocephalic  EYES: EOMI, PERRLA, conjunctiva and sclera clear  NECK: Supple, No JVD, Normal thyroid  NERVOUS SYSTEM:  Alert & Oriented X3, Good concentration;   CHEST/LUNG: Clear to percussion bilaterally; No rales, rhonchi, wheezing, or rubs  HEART: IRRegular rate and rhythm  ABDOMEN: Soft, Nontender, Nondistended; Bowel sounds present  EXTREMITIES: No clubbing, cyanosis, or edema  SKIN: No rashes or lesions    LABS:              CAPILLARY BLOOD GLUCOSE          RADIOLOGY & ADDITIONAL TESTS:    Imaging Personally Reviewed:  [ ] YES  [ ] NO    Consultant(s) Notes Reviewed:  [ ] YES  [ ] NO    Care Discussed with Consultants/Other Providers [ ] YES  [ ] NO

## 2022-09-26 NOTE — PROGRESS NOTE ADULT - ASSESSMENT
A FIB WITH RVR  STARTED ON IV CARDIZEM  IV HEPAIRIN  CARDIOLOGY ON THE CASE    ACT PANCRITITIS  Pt comfortable and in no distress.  At this time would continue cardizem IVP Prn for HR >125.  send serial ce.  Cardio follow up in am.  consider ep eval.  Reconsult ICU prn.    CNS: continue bipolar meds      PULMONARY: supplemental O2 prn    CARDIOVASCULAR: cardio f/u, cardizem prn. repeat ekg in am, check tft's, consider ep eval.    GI: GI prophylaxis.  Feeding     RENAL: monitor UO    INFECTIOUS DISEASE: continue current abx    HEMATOLOGICAL:  DVT prophylaxis.      MUSCULOSKELETAL: oob to chair

## 2022-09-26 NOTE — PROGRESS NOTE ADULT - ASSESSMENT
Impression:  #SVT    -ECHO: EF 65-70%, Normal global LVSF, No diastolic dysfunction      Upon reviewing the ECG strips and event strips with Dr He, noted to be in SVT with conversion to junctional rhythm  On telemetry monitor noted in sinus rhythm, and stat ECG shows sinus rhythm  Start Cardizem CD 120mg PO daily, and wean off Cardizem gtt as tolerated  Uptitrate Cardizem CD if need for better rate control   Repeat ECG in am  Monitor subha

## 2022-09-26 NOTE — PROGRESS NOTE ADULT - ASSESSMENT
67yFemale pmh bipolar mood disorder on macrobid for UTI presents for mild epigastric pain and back pain. Currently patient feeling much better.      Problem 1-Acute pancreatitis 2/3 clinical picture and CT imaging show acute pancreatitis    Indistinct pancreatic head hypodensity, possibly due to mild focal   interstitial edematous pancreatitis. In addition, there are subtle   findings of right upper quadrant inflammatory change including mild   gallbladder wall thickening and circumferential duodenal thickening that   may be seen with duodenitis or secondary to pancreatic inflammation.  If patient's symptomatology persists, follow-up CT with intravenous   contrast is recommended to further evaluate the pancreas.  transaminitis   Rec  -Continue low fat diet  -Monitor LFTs daily   -RUQ Abdominal Ultrasound appreciated   -Fasting Lipid Panel   -EUS Wednesday     Problem 2-Transaminitis  normal caliber CBD  ddx r/o CBD stone, possibly DILI  Rec  -Avoid hepatotoxic medications  -EUS Wednesday at noon  -Monitor LFTs daily     Problem 3- Hcip hepatis 1.5 cm lymph node and additional para-aortic and   paracaval lymph nodes, possibly reactive. Short-term follow-up with MRI   is recommended to exclude other etiologies.  Rec  - Care as per primary team  67yFemale pmh bipolar mood disorder on macrobid for UTI presents for mild epigastric pain and back pain. Currently patient feeling much better.      Problem 1-Acute pancreatitis 2/3 clinical picture and CT imaging show acute pancreatitis : Acute illness with systemic symptoms   Indistinct pancreatic head hypodensity, possibly due to mild focal   interstitial edematous pancreatitis. In addition, there are subtle   findings of right upper quadrant inflammatory change including mild   gallbladder wall thickening and circumferential duodenal thickening that   may be seen with duodenitis or secondary to pancreatic inflammation.  If patient's symptomatology persists, follow-up CT with intravenous   contrast is recommended to further evaluate the pancreas.  transaminitis   Rec  -Continue low fat diet  -Monitor LFTs daily   -RUQ Abdominal Ultrasound appreciated   -Fasting Lipid Panel   -EUS Wednesday     Problem 2-Transaminitis  normal caliber CBD  ddx r/o CBD stone, possibly DILI  Rec  -Avoid hepatotoxic medications  -EUS Wednesday at noon  -Monitor LFTs daily     Problem 3- Chip hepatis 1.5 cm lymph node and additional para-aortic and   paracaval lymph nodes, possibly reactive. Short-term follow-up with MRI   is recommended to exclude other etiologies.  Rec  - Care as per primary team

## 2022-09-26 NOTE — PROGRESS NOTE ADULT - SUBJECTIVE AND OBJECTIVE BOX
67yFemale  Being followed for acute pancreatitis   Interval history: Patient denies nausea, vomiting, hematemesis, melena, blood in stool, diarrhea, constipation, abdominal pain. Patient tolerating diet, patient reports complete improvement.       PAST MEDICAL & SURGICAL HISTORY:   Bipolar mood disorder      S/P tonsillectomy      H/O ovarian cystectomy                Social History: No smoking. No alcohol. No illegal drug use.            MEDICATIONS  (STANDING):  aMIOdarone Infusion 1 mG/Min (33.3 mL/Hr) IV Continuous <Continuous>  aMIOdarone Infusion 0.5 mG/Min (16.7 mL/Hr) IV Continuous <Continuous>  amoxicillin  875 milliGRAM(s)/clavulanate 1 Tablet(s) Oral two times a day  buPROPion XL (24-Hour) . 150 milliGRAM(s) Oral daily  diltiazem Infusion 5 mG/Hr (5 mL/Hr) IV Continuous <Continuous>  haloperidol     Tablet 2 milliGRAM(s) Oral daily  heparin   Injectable 5000 Unit(s) SubCutaneous every 12 hours  OXcarbazepine 150 milliGRAM(s) Oral two times a day  pantoprazole  Injectable 40 milliGRAM(s) IV Push daily  sodium chloride 0.9%. 1000 milliLiter(s) (125 mL/Hr) IV Continuous <Continuous>    MEDICATIONS  (PRN):  fluticasone propionate 50 MICROgram(s)/spray Nasal Spray 1 Spray(s) Both Nostrils two times a day PRN nasal congestion  guaifenesin/dextromethorphan Oral Liquid 10 milliLiter(s) Oral every 6 hours PRN Cough  ibuprofen  Tablet. 400 milliGRAM(s) Oral four times a day PRN Mild Pain (1 - 3)  LORazepam     Tablet 0.5 milliGRAM(s) Oral every 12 hours PRN Anxiety  morphine  - Injectable 4 milliGRAM(s) IV Push every 4 hours PRN Severe Pain (7 - 10)  zolpidem 5 milliGRAM(s) Oral at bedtime PRN Insomnia  zolpidem 5 milliGRAM(s) Oral at bedtime PRN Insomnia      Allergies:   No Known Allergies          REVIEW OF SYSTEMS:  General:  No weight loss, fevers, or chills.  Eyes:  No reported pain or visual changes  ENT:  No sore throat or runny nose.  NECK: No stiffness   CV:  No chest pain or palpitations.  Resp:  No shortness of breath, cough  GI:  No abdominal pain, nausea, vomiting, dysphagia, diarrhea or constipation. No rectal bleeding, melena, or hematemesis.  Neuro:  No tingling, numbness         VITAL SIGNS:   T(F): 98.5 (09-26-22 @ 05:30), Max: 98.5 (09-26-22 @ 05:30)  HR: 153 (09-26-22 @ 12:51) (62 - 158)  BP: 120/78 (09-26-22 @ 12:51) (117/86 - 138/77)  RR: 16 (09-26-22 @ 12:51) (16 - 18)  SpO2: 97% (09-26-22 @ 12:51) (96% - 97%)    PHYSICAL EXAM:  GENERAL: AAOx3, no acute distress.  HEAD:  Atraumatic, Normocephalic  EYES: conjunctiva and sclera clear  NECK: Supple, no JVD or thyromegaly  CHEST/LUNG: Clear to auscultation bilaterally; No wheeze, rhonchi, or rales  HEART: Regular rate and rhythm; normal S1, S2, No murmurs.  ABDOMEN: Soft, nontender, nondistended; Bowel sounds present  NEUROLOGY: No asterixis or tremor.   SKIN: Intact, no jaundice            LABS:  none today              IMAGING:    < from: CT Abdomen and Pelvis w/ IV Cont (09.22.22 @ 18:33) >    ACC: [id number] EXAM:  CT ABDOMEN AND PELVIS IC                          PROCEDURE DATE:  09/22/2022          INTERPRETATION:  CLINICAL STATEMENT: Epigastric pain  urinary symptoms.   WBC 8.87  Lipase 131    TECHNIQUE: Contiguous axial CT images wereobtained from the lower chest   to the pubic symphysis following the administration of 94 cc Omnipaque   350 intravenous contrast.  Oral contrast was administered.  Reformatted   images in the coronal and sagittal planes were acquired.    COMPARISON CT: None.    FINDINGS:    LOWER CHEST: Unremarkable.    HEPATOBILIARY: Mild gallbladder wall thickening, nonspecific.    SPLEEN: Unremarkable.    PANCREAS: Indistinct pancreatic head hypodensity, not able to be measured   (4-101).    ADRENAL GLANDS: Thickening of the left adrenal gland. Unremarkable right   adrenal gland.    KIDNEYS: Symmetric renal enhancement. No hydronephrosis. Bilateral renal   cysts and additional subcentimeter hypodensities, too small to   characterize.    ABDOMINOPELVIC NODES: Chip hepatis 1.5 cm lymph node and additional   para-aortic and paracaval lymph nodes..    PELVIC ORGANS: Unremarkable.    PERITONEUM/MESENTERY/BOWEL: No bowel obstruction, pneumoperitoneum or   ascites. Normal appendix. Colonic diverticulosis without acute   diverticulitis. Mild thickening of the descending portion of the duodenum   (4-126).    BONES/SOFT TISSUES: Degenerative changes of the spine. Grade 1   anterolisthesis of L4 on L5.    OTHER: Vascular calcifications.      IMPRESSION:    1. Indistinct pancreatic head hypodensity, possibly due to mild focal   interstitial edematous pancreatitis. In addition, there are subtle   findings of right upper quadrant inflammatory change including mild   gallbladder wall thickening and circumferential duodenal thickening that   may be seen with duodenitis or secondary to pancreatic inflammation.    If patient's symptomatology persists, follow-up CT with intravenous   contrast is recommended to further evaluate the pancreas.      2. Chip hepatis 1.5 cm lymph node and additional para-aortic and   paracaval lymph nodes, possibly reactive. Short-term follow-up with MRI   is recommended to exclude other etiologies.    --- End of Report ---          LORNE GOMEZ DO; Resident Radiologist  This document has been electronically signed.  MONTRELL VANEGAS MD; Attending Interventional Radiologist  This document has been electronically signed. Sep 22 2022  7:33PM    < end of copied text >         67yFemale  Being followed for acute pancreatitis   Interval history: Patient denies nausea, vomiting, hematemesis, melena, blood in stool, diarrhea, constipation, abdominal pain. Patient tolerating diet, patient reports complete improvement.       PAST MEDICAL & SURGICAL HISTORY:   Bipolar mood disorder      S/P tonsillectomy      H/O ovarian cystectomy                Social History: No smoking. No alcohol. No illegal drug use.            MEDICATIONS  (STANDING):  aMIOdarone Infusion 1 mG/Min (33.3 mL/Hr) IV Continuous <Continuous>  aMIOdarone Infusion 0.5 mG/Min (16.7 mL/Hr) IV Continuous <Continuous>  amoxicillin  875 milliGRAM(s)/clavulanate 1 Tablet(s) Oral two times a day  buPROPion XL (24-Hour) . 150 milliGRAM(s) Oral daily  diltiazem Infusion 5 mG/Hr (5 mL/Hr) IV Continuous <Continuous>  haloperidol     Tablet 2 milliGRAM(s) Oral daily  heparin   Injectable 5000 Unit(s) SubCutaneous every 12 hours  OXcarbazepine 150 milliGRAM(s) Oral two times a day  pantoprazole  Injectable 40 milliGRAM(s) IV Push daily  sodium chloride 0.9%. 1000 milliLiter(s) (125 mL/Hr) IV Continuous <Continuous>    MEDICATIONS  (PRN):  fluticasone propionate 50 MICROgram(s)/spray Nasal Spray 1 Spray(s) Both Nostrils two times a day PRN nasal congestion  guaifenesin/dextromethorphan Oral Liquid 10 milliLiter(s) Oral every 6 hours PRN Cough  ibuprofen  Tablet. 400 milliGRAM(s) Oral four times a day PRN Mild Pain (1 - 3)  LORazepam     Tablet 0.5 milliGRAM(s) Oral every 12 hours PRN Anxiety  morphine  - Injectable 4 milliGRAM(s) IV Push every 4 hours PRN Severe Pain (7 - 10)  zolpidem 5 milliGRAM(s) Oral at bedtime PRN Insomnia  zolpidem 5 milliGRAM(s) Oral at bedtime PRN Insomnia      Allergies:   No Known Allergies          REVIEW OF SYSTEMS:  General:  No weight loss, fevers, or chills.  Eyes:  No reported pain or visual changes  ENT:  No sore throat or runny nose.  NECK: No stiffness   CV:  No chest pain or palpitations.  Resp:  No shortness of breath, cough  GI:  No abdominal pain, nausea, vomiting, dysphagia, diarrhea or constipation. No rectal bleeding, melena, or hematemesis.  Neuro:  No tingling, numbness         VITAL SIGNS:   T(F): 98.5 (09-26-22 @ 05:30), Max: 98.5 (09-26-22 @ 05:30)  HR: 153 (09-26-22 @ 12:51) (62 - 158)  BP: 120/78 (09-26-22 @ 12:51) (117/86 - 138/77)  RR: 16 (09-26-22 @ 12:51) (16 - 18)  SpO2: 97% (09-26-22 @ 12:51) (96% - 97%)    PHYSICAL EXAM:  GENERAL: AAOx3, no acute distress.  HEAD:  Atraumatic, Normocephalic  EYES: conjunctiva and sclera clear  NECK: Supple, no JVD or thyromegaly  CHEST/LUNG: Clear to auscultation bilaterally; No wheeze, rhonchi, or rales  HEART: Regular rate and rhythm; normal S1, S2, No murmurs.  ABDOMEN: Soft, nontender, nondistended; Bowel sounds present  NEUROLOGY: No asterixis or tremor.   SKIN: Intact, no jaundice            LABS:  none today              IMAGING:    < from: CT Abdomen and Pelvis w/ IV Cont (09.22.22 @ 18:33) > This data was reviewed.     ACC: [id number] EXAM:  CT ABDOMEN AND PELVIS IC                          PROCEDURE DATE:  09/22/2022          INTERPRETATION:  CLINICAL STATEMENT: Epigastric pain  urinary symptoms.   WBC 8.87  Lipase 131    TECHNIQUE: Contiguous axial CT images wereobtained from the lower chest   to the pubic symphysis following the administration of 94 cc Omnipaque   350 intravenous contrast.  Oral contrast was administered.  Reformatted   images in the coronal and sagittal planes were acquired.    COMPARISON CT: None.    FINDINGS:    LOWER CHEST: Unremarkable.    HEPATOBILIARY: Mild gallbladder wall thickening, nonspecific.    SPLEEN: Unremarkable.    PANCREAS: Indistinct pancreatic head hypodensity, not able to be measured   (4-101).    ADRENAL GLANDS: Thickening of the left adrenal gland. Unremarkable right   adrenal gland.    KIDNEYS: Symmetric renal enhancement. No hydronephrosis. Bilateral renal   cysts and additional subcentimeter hypodensities, too small to   characterize.    ABDOMINOPELVIC NODES: Chip hepatis 1.5 cm lymph node and additional   para-aortic and paracaval lymph nodes..    PELVIC ORGANS: Unremarkable.    PERITONEUM/MESENTERY/BOWEL: No bowel obstruction, pneumoperitoneum or   ascites. Normal appendix. Colonic diverticulosis without acute   diverticulitis. Mild thickening of the descending portion of the duodenum   (4-126).    BONES/SOFT TISSUES: Degenerative changes of the spine. Grade 1   anterolisthesis of L4 on L5.    OTHER: Vascular calcifications.      IMPRESSION:    1. Indistinct pancreatic head hypodensity, possibly due to mild focal   interstitial edematous pancreatitis. In addition, there are subtle   findings of right upper quadrant inflammatory change including mild   gallbladder wall thickening and circumferential duodenal thickening that   may be seen with duodenitis or secondary to pancreatic inflammation.    If patient's symptomatology persists, follow-up CT with intravenous   contrast is recommended to further evaluate the pancreas.      2. Chip hepatis 1.5 cm lymph node and additional para-aortic and   paracaval lymph nodes, possibly reactive. Short-term follow-up with MRI   is recommended to exclude other etiologies.    --- End of Report ---          LORNE GOMEZ DO; Resident Radiologist  This document has been electronically signed.  MONTRELL VANEGAS MD; Attending Interventional Radiologist  This document has been electronically signed. Sep 22 2022  7:33PM    < end of copied text >

## 2022-09-26 NOTE — PROGRESS NOTE ADULT - SUBJECTIVE AND OBJECTIVE BOX
Subjective/Objective:     HPI-Cardiology/Events/Updates  Pt evaluated at bedside. Overnight Pt went into Rapid/AFib/Flutter?. Was started on Cardizem gtt. Pt appears comfortable, and denies any complaints. Radiology tests and hospital records, were reviewed, as well as previous notes on this patient.        MEDICATIONS  (STANDING):  aMIOdarone Infusion 1 mG/Min (33.3 mL/Hr) IV Continuous <Continuous>  aMIOdarone Infusion 0.5 mG/Min (16.7 mL/Hr) IV Continuous <Continuous>  amoxicillin  875 milliGRAM(s)/clavulanate 1 Tablet(s) Oral two times a day  buPROPion XL (24-Hour) . 150 milliGRAM(s) Oral daily  diltiazem Infusion 7.5 mG/Hr (7.5 mL/Hr) IV Continuous <Continuous>  haloperidol     Tablet 2 milliGRAM(s) Oral daily  heparin   Injectable 5000 Unit(s) SubCutaneous every 12 hours  OXcarbazepine 150 milliGRAM(s) Oral two times a day  pantoprazole  Injectable 40 milliGRAM(s) IV Push daily  sodium chloride 0.9%. 1000 milliLiter(s) (125 mL/Hr) IV Continuous <Continuous>    MEDICATIONS  (PRN):  fluticasone propionate 50 MICROgram(s)/spray Nasal Spray 1 Spray(s) Both Nostrils two times a day PRN nasal congestion  guaifenesin/dextromethorphan Oral Liquid 10 milliLiter(s) Oral every 6 hours PRN Cough  ibuprofen  Tablet. 400 milliGRAM(s) Oral four times a day PRN Mild Pain (1 - 3)  LORazepam     Tablet 0.5 milliGRAM(s) Oral every 12 hours PRN Anxiety  morphine  - Injectable 4 milliGRAM(s) IV Push every 4 hours PRN Severe Pain (7 - 10)  zolpidem 5 milliGRAM(s) Oral at bedtime PRN Insomnia  zolpidem 5 milliGRAM(s) Oral at bedtime PRN Insomnia        Vital Signs Last 24 Hrs  T(C): 36.3 (26 Sep 2022 14:00), Max: 36.9 (25 Sep 2022 17:00)  T(F): 97.4 (26 Sep 2022 14:00), Max: 98.5 (26 Sep 2022 05:30)  HR: 67 (26 Sep 2022 14:00) (62 - 153)  BP: 115/71 (26 Sep 2022 14:00) (115/71 - 133/76)  BP(mean): --  RR: 18 (26 Sep 2022 14:00) (16 - 18)  SpO2: 97% (26 Sep 2022 12:51) (96% - 97%)    Parameters below as of 26 Sep 2022 12:51  Patient On (Oxygen Delivery Method): nasal cannula      I&O's Detail    25 Sep 2022 07:01  -  26 Sep 2022 07:00  --------------------------------------------------------  IN:    Oral Fluid: 330 mL  Total IN: 330 mL    OUT:    Voided (mL): 2500 mL  Total OUT: 2500 mL    Total NET: -2170 mL      26 Sep 2022 07:01  -  26 Sep 2022 16:35  --------------------------------------------------------  IN:    Oral Fluid: 870 mL  Total IN: 870 mL    OUT:    Voided (mL): 800 mL  Total OUT: 800 mL    Total NET: 70 mL      GENERAL:  68y/o Female NAD, resting comfortably.  HEAD:  Atraumatic, Normocephalic  EYES: EOMI, PERRLA, conjunctiva and sclera clear  NECK: Supple, No JVD, no cervical lymphadenopathy, non-tender  CHEST/LUNG: Clear to auscultation bilaterally; No wheeze, rhonchi, or rales  HEART: Irregular rate and rhythm, S1&S2  ABDOMEN: Soft, Nontender, Nondistended x 4 quadrants; Bowel sounds present  EXTREMITIES:   Peripheral Pulses Present, No clubbing, no cyanosis, or no edema, no calf tenderness  PSYCH: AAOx3, cooperative, appropriate  NEUROLOGY: WNL  SKIN: WNL          Diagnostic testing:  < from: TTE Echo Complete w/o Contrast w/ Doppler (09.26.22 @ 09:36) >      Summary:   1. Left ventricular ejection fraction, by visual estimation, is 65 to   70%.   2. Normal left ventricular size and wall thicknesses, with normal   systolic and diastolic function.    < end of copied text >

## 2022-09-27 LAB
ALBUMIN SERPL ELPH-MCNC: 3.7 G/DL — SIGNIFICANT CHANGE UP (ref 3.5–5.2)
ALP SERPL-CCNC: 220 U/L — HIGH (ref 30–115)
ALT FLD-CCNC: 170 U/L — HIGH (ref 0–41)
ANION GAP SERPL CALC-SCNC: 11 MMOL/L — SIGNIFICANT CHANGE UP (ref 7–14)
APTT BLD: 30.5 SEC — SIGNIFICANT CHANGE UP (ref 27–39.2)
AST SERPL-CCNC: 66 U/L — HIGH (ref 0–41)
BILIRUB DIRECT SERPL-MCNC: 0.5 MG/DL — HIGH (ref 0–0.3)
BILIRUB INDIRECT FLD-MCNC: 0.2 MG/DL — SIGNIFICANT CHANGE UP (ref 0.2–1.2)
BILIRUB SERPL-MCNC: 0.7 MG/DL — SIGNIFICANT CHANGE UP (ref 0.2–1.2)
BUN SERPL-MCNC: 19 MG/DL — SIGNIFICANT CHANGE UP (ref 10–20)
CALCIUM SERPL-MCNC: 8.9 MG/DL — SIGNIFICANT CHANGE UP (ref 8.4–10.5)
CHLORIDE SERPL-SCNC: 109 MMOL/L — SIGNIFICANT CHANGE UP (ref 98–110)
CO2 SERPL-SCNC: 23 MMOL/L — SIGNIFICANT CHANGE UP (ref 17–32)
CREAT SERPL-MCNC: 1.8 MG/DL — HIGH (ref 0.7–1.5)
EGFR: 31 ML/MIN/1.73M2 — LOW
GLUCOSE SERPL-MCNC: 99 MG/DL — SIGNIFICANT CHANGE UP (ref 70–99)
HCT VFR BLD CALC: 33.7 % — LOW (ref 37–47)
HGB BLD-MCNC: 10.8 G/DL — LOW (ref 12–16)
INR BLD: 0.98 RATIO — SIGNIFICANT CHANGE UP (ref 0.65–1.3)
MCHC RBC-ENTMCNC: 30 PG — SIGNIFICANT CHANGE UP (ref 27–31)
MCHC RBC-ENTMCNC: 32 G/DL — SIGNIFICANT CHANGE UP (ref 32–37)
MCV RBC AUTO: 93.6 FL — SIGNIFICANT CHANGE UP (ref 81–99)
NRBC # BLD: 0 /100 WBCS — SIGNIFICANT CHANGE UP (ref 0–0)
PLATELET # BLD AUTO: 383 K/UL — SIGNIFICANT CHANGE UP (ref 130–400)
POTASSIUM SERPL-MCNC: 4.4 MMOL/L — SIGNIFICANT CHANGE UP (ref 3.5–5)
POTASSIUM SERPL-SCNC: 4.4 MMOL/L — SIGNIFICANT CHANGE UP (ref 3.5–5)
PROT SERPL-MCNC: 6.1 G/DL — SIGNIFICANT CHANGE UP (ref 6–8)
PROTHROM AB SERPL-ACNC: 11.3 SEC — SIGNIFICANT CHANGE UP (ref 9.95–12.87)
RBC # BLD: 3.6 M/UL — LOW (ref 4.2–5.4)
RBC # FLD: 13.6 % — SIGNIFICANT CHANGE UP (ref 11.5–14.5)
SODIUM SERPL-SCNC: 143 MMOL/L — SIGNIFICANT CHANGE UP (ref 135–146)
WBC # BLD: 8 K/UL — SIGNIFICANT CHANGE UP (ref 4.8–10.8)
WBC # FLD AUTO: 8 K/UL — SIGNIFICANT CHANGE UP (ref 4.8–10.8)

## 2022-09-27 PROCEDURE — 99232 SBSQ HOSP IP/OBS MODERATE 35: CPT

## 2022-09-27 RX ADMIN — Medication 0.5 MILLIGRAM(S): at 00:59

## 2022-09-27 RX ADMIN — PANTOPRAZOLE SODIUM 40 MILLIGRAM(S): 20 TABLET, DELAYED RELEASE ORAL at 18:15

## 2022-09-27 RX ADMIN — HEPARIN SODIUM 5000 UNIT(S): 5000 INJECTION INTRAVENOUS; SUBCUTANEOUS at 18:17

## 2022-09-27 RX ADMIN — Medication 1 TABLET(S): at 06:01

## 2022-09-27 RX ADMIN — OXCARBAZEPINE 150 MILLIGRAM(S): 300 TABLET, FILM COATED ORAL at 18:16

## 2022-09-27 RX ADMIN — BUPROPION HYDROCHLORIDE 150 MILLIGRAM(S): 150 TABLET, EXTENDED RELEASE ORAL at 18:14

## 2022-09-27 RX ADMIN — Medication 1 TABLET(S): at 18:15

## 2022-09-27 RX ADMIN — Medication 0.5 MILLIGRAM(S): at 23:17

## 2022-09-27 RX ADMIN — SODIUM CHLORIDE 125 MILLILITER(S): 9 INJECTION INTRAMUSCULAR; INTRAVENOUS; SUBCUTANEOUS at 08:49

## 2022-09-27 RX ADMIN — ZOLPIDEM TARTRATE 5 MILLIGRAM(S): 10 TABLET ORAL at 23:17

## 2022-09-27 RX ADMIN — Medication 120 MILLIGRAM(S): at 18:14

## 2022-09-27 RX ADMIN — OXCARBAZEPINE 150 MILLIGRAM(S): 300 TABLET, FILM COATED ORAL at 06:01

## 2022-09-27 RX ADMIN — SODIUM CHLORIDE 125 MILLILITER(S): 9 INJECTION INTRAMUSCULAR; INTRAVENOUS; SUBCUTANEOUS at 18:14

## 2022-09-27 RX ADMIN — HALOPERIDOL DECANOATE 2 MILLIGRAM(S): 100 INJECTION INTRAMUSCULAR at 21:45

## 2022-09-27 RX ADMIN — Medication 1 SPRAY(S): at 18:17

## 2022-09-27 NOTE — PROGRESS NOTE ADULT - ASSESSMENT
67yFemale pmh bipolar mood disorder on macrobid for UTI presents for mild epigastric pain and back pain. Currently patient feeling much better.      Problem 1-Acute pancreatitis 2/3 clinical picture and CT imaging show acute pancreatitis : Acute illness with systemic symptoms   Indistinct pancreatic head hypodensity, possibly due to mild focal   interstitial edematous pancreatitis. In addition, there are subtle   findings of right upper quadrant inflammatory change including mild   gallbladder wall thickening and circumferential duodenal thickening that   may be seen with duodenitis or secondary to pancreatic inflammation.  If patient's symptomatology persists, follow-up CT with intravenous   contrast is recommended to further evaluate the pancreas.  transaminitis   Rec  -Continue low fat diet  -Monitor LFTs daily   -RUQ Abdominal Ultrasound appreciated   -Fasting Lipid Panel   -EUS Wednesday   -NPO aftermidnight     Problem 2-Transaminitis  normal caliber CBD  ddx r/o CBD stone, possibly DILI  Rec  -Avoid hepatotoxic medications  -EUS Wednesday at noon  -Monitor LFTs daily     Problem 3- Chip hepatis 1.5 cm lymph node and additional para-aortic and   paracaval lymph nodes, possibly reactive. Short-term follow-up with MRI   is recommended to exclude other etiologies.  Rec  - Care as per primary team  67yFemale pmh bipolar mood disorder on macrobid for UTI presents for mild epigastric pain and back pain. Currently patient feeling much better.      Problem 1-Acute pancreatitis 2/3 clinical picture and CT imaging show acute pancreatitis : Acute illness with systemic symptoms   Indistinct pancreatic head hypodensity, possibly due to mild focal   interstitial edematous pancreatitis. In addition, there are subtle   findings of right upper quadrant inflammatory change including mild   gallbladder wall thickening and circumferential duodenal thickening that   may be seen with duodenitis or secondary to pancreatic inflammation.  If patient's symptomatology persists, follow-up CT with intravenous   contrast is recommended to further evaluate the pancreas.  transaminitis   Rec  -Continue low fat diet  -Monitor LFTs daily   -RUQ Abdominal Ultrasound appreciated   -Fasting Lipid Panel   -EUS Wednesday 12:30p.M. patient to be at [facility] for 11:30A. M.  -NPO aftermidnight     Problem 2-Transaminitis  normal caliber CBD  ddx r/o CBD stone, possibly DILI  Rec  -Avoid hepatotoxic medications  -EUS Wednesday at noon  -Monitor LFTs daily     Problem 3- Chip hepatis 1.5 cm lymph node and additional para-aortic and   paracaval lymph nodes, possibly reactive. Short-term follow-up with MRI   is recommended to exclude other etiologies.  Rec  - Care as per primary team

## 2022-09-27 NOTE — PROGRESS NOTE ADULT - SUBJECTIVE AND OBJECTIVE BOX
Patient is a 67y old  Female who presents with a chief complaint of urinary symptoms (27 Sep 2022 11:49)      INTERVAL HPI/OVERNIGHT EVENTS:    MEDICATIONS  (STANDING):  aMIOdarone Infusion 1 mG/Min (33.3 mL/Hr) IV Continuous <Continuous>  aMIOdarone Infusion 0.5 mG/Min (16.7 mL/Hr) IV Continuous <Continuous>  amoxicillin  875 milliGRAM(s)/clavulanate 1 Tablet(s) Oral two times a day  buPROPion XL (24-Hour) . 150 milliGRAM(s) Oral daily  diltiazem    milliGRAM(s) Oral daily  haloperidol     Tablet 2 milliGRAM(s) Oral daily  heparin   Injectable 5000 Unit(s) SubCutaneous every 12 hours  OXcarbazepine 150 milliGRAM(s) Oral two times a day  pantoprazole  Injectable 40 milliGRAM(s) IV Push daily  sodium chloride 0.9%. 1000 milliLiter(s) (125 mL/Hr) IV Continuous <Continuous>    MEDICATIONS  (PRN):  fluticasone propionate 50 MICROgram(s)/spray Nasal Spray 1 Spray(s) Both Nostrils two times a day PRN nasal congestion  guaifenesin/dextromethorphan Oral Liquid 10 milliLiter(s) Oral every 6 hours PRN Cough  ibuprofen  Tablet. 400 milliGRAM(s) Oral four times a day PRN Mild Pain (1 - 3)  LORazepam     Tablet 0.5 milliGRAM(s) Oral every 12 hours PRN Anxiety  morphine  - Injectable 4 milliGRAM(s) IV Push every 4 hours PRN Severe Pain (7 - 10)  zolpidem 5 milliGRAM(s) Oral at bedtime PRN Insomnia  zolpidem 5 milliGRAM(s) Oral at bedtime PRN Insomnia      Allergies    No Known Allergies    Intolerances        REVIEW OF SYSTEMS:  CONSTITUTIONAL: No fever,   EYES: No eye pain, visual disturbances, or discharge  NECK: No pain or stiffness  RESPIRATORY: No cough, wheezing, chills or hemoptysis; No shortness of breath  CARDIOVASCULAR: No chest pain, palpitations, dizziness, or leg swelling  GASTROINTESTINAL: No abdominal or epigastric pain. No nausea, vomiting, or hematemesis; No diarrhea or constipation. No melena or hematochezia.  SKIN: No itching, burning, rashes, or lesions   ALLERGY AND IMMUNOLOGIC: No hives or eczema    Vital Signs Last 24 Hrs  T(C): 36.7 (27 Sep 2022 05:00), Max: 36.7 (27 Sep 2022 05:00)  T(F): 98 (27 Sep 2022 05:00), Max: 98 (27 Sep 2022 05:00)  HR: 79 (27 Sep 2022 07:21) (63 - 79)  BP: 113/59 (27 Sep 2022 05:00) (113/59 - 136/76)  BP(mean): --  RR: 16 (27 Sep 2022 07:21) (16 - 18)  SpO2: --        PHYSICAL EXAM:  GENERAL: NAD  HEAD:  Atraumatic, Normocephalic  EYES: EOMI, PERRLA, conjunctiva and sclera clear  NECK: Supple, No JVD, Normal thyroid  NERVOUS SYSTEM:  Alert & Oriented X3  CHEST/LUNG: Clear to percussion bilaterally; No rales, rhonchi, wheezing, or rubs  HEART: Regular rate and rhythm; No murmurs, rubs, or gallops  ABDOMEN: Soft, Nontender, Nondistended; Bowel sounds present  SKIN: No rashes or lesions    LABS:        TPro  6.1  /  Alb  3.7  /  TBili  0.7  /  DBili  0.5<H>  /  AST  66<H>  /  ALT  170<H>  /  AlkPhos  220<H>  09-27        CAPILLARY BLOOD GLUCOSE          RADIOLOGY & ADDITIONAL TESTS:    Imaging Personally Reviewed:  [ ] YES  [ ] NO    Consultant(s) Notes Reviewed:  [ ] YES  [ ] NO    Care Discussed with Consultants/Other Providers [ ] YES  [ ] NO

## 2022-09-27 NOTE — PROGRESS NOTE ADULT - NS ATTEST RISK PROBLEM GEN_ALL_CORE FT
decision to perform endoscopic procedure in pt without identified risk factors
Decision to perform diagnostic endoscopic procedure

## 2022-09-27 NOTE — PROGRESS NOTE ADULT - SUBJECTIVE AND OBJECTIVE BOX
67yFemale  Being followed for acute pancreatitis   Interval history: Patient denies nausea, vomiting, hematemesis, melena, blood in stool, diarrhea, constipation, abdominal pain. Patient tolerating diet.      PAST MEDICAL & SURGICAL HISTORY:   Bipolar mood disorder      S/P tonsillectomy      H/O ovarian cystectomy                Social History: No smoking. No alcohol. No illegal drug use.            MEDICATIONS  (STANDING):  aMIOdarone Infusion 1 mG/Min (33.3 mL/Hr) IV Continuous <Continuous>  aMIOdarone Infusion 0.5 mG/Min (16.7 mL/Hr) IV Continuous <Continuous>  amoxicillin  875 milliGRAM(s)/clavulanate 1 Tablet(s) Oral two times a day  buPROPion XL (24-Hour) . 150 milliGRAM(s) Oral daily  diltiazem    milliGRAM(s) Oral daily  haloperidol     Tablet 2 milliGRAM(s) Oral daily  heparin   Injectable 5000 Unit(s) SubCutaneous every 12 hours  OXcarbazepine 150 milliGRAM(s) Oral two times a day  pantoprazole  Injectable 40 milliGRAM(s) IV Push daily  sodium chloride 0.9%. 1000 milliLiter(s) (125 mL/Hr) IV Continuous <Continuous>      fluticasone propionate 50 MICROgram(s)/spray Nasal Spray 1 Spray(s) Both Nostrils two times a day PRN nasal congestion  guaifenesin/dextromethorphan Oral Liquid 10 milliLiter(s) Oral every 6 hours PRN Cough  ibuprofen  Tablet. 400 milliGRAM(s) Oral four times a day PRN Mild Pain (1 - 3)  LORazepam     Tablet 0.5 milliGRAM(s) Oral every 12 hours PRN Anxiety  morphine  - Injectable 4 milliGRAM(s) IV Push every 4 hours PRN Severe Pain (7 - 10)  zolpidem 5 milliGRAM(s) Oral at bedtime PRN Insomnia  zolpidem 5 milliGRAM(s) Oral at bedtime PRN Insomnia      Allergies:    No Known Allergies            REVIEW OF SYSTEMS:  General:  No weight loss, fevers, or chills.  Eyes:  No reported pain or visual changes  ENT:  No sore throat or runny nose.  NECK: No stiffness   CV:  No chest pain or palpitations.  Resp:  No shortness of breath, cough  GI:  No abdominal pain, nausea, vomiting, dysphagia, diarrhea or constipation. No rectal bleeding, melena, or hematemesis.  Muscle:  No aches or weakness  Neuro:  No tingling, numbness     VITAL SIGNS:   T(F): 98 (09-27-22 @ 05:00), Max: 98 (09-27-22 @ 05:00)  HR: 79 (09-27-22 @ 07:21) (63 - 153)  BP: 113/59 (09-27-22 @ 05:00) (113/59 - 136/76)  RR: 16 (09-27-22 @ 07:21) (16 - 18)  SpO2: 97% (09-26-22 @ 12:51) (97% - 97%)    PHYSICAL EXAM:  GENERAL: AAOx3, no acute distress.  HEAD:  Atraumatic, Normocephalic  EYES: conjunctiva and sclera clear  NECK: Supple, no JVD or thyromegaly  CHEST/LUNG: Clear to auscultation bilaterally; No wheeze, rhonchi, or rales  HEART: Regular rate and rhythm; normal S1, S2, No murmurs.  ABDOMEN: Soft, nontender, nondistended; Bowel sounds present  NEUROLOGY: No asterixis or tremor.   SKIN: Intact, no jaundice            LABS:                IMAGING:    < from: CT Abdomen and Pelvis w/ IV Cont (09.22.22 @ 18:33) >    ACC: [id number] EXAM:  CT ABDOMEN AND PELVIS IC                          PROCEDURE DATE:  09/22/2022          INTERPRETATION:  CLINICAL STATEMENT: Epigastric pain  urinary symptoms.   WBC 8.87  Lipase 131    TECHNIQUE: Contiguous axial CT images wereobtained from the lower chest   to the pubic symphysis following the administration of 94 cc Omnipaque   350 intravenous contrast.  Oral contrast was administered.  Reformatted   images in the coronal and sagittal planes were acquired.    COMPARISON CT: None.    FINDINGS:    LOWER CHEST: Unremarkable.    HEPATOBILIARY: Mild gallbladder wall thickening, nonspecific.    SPLEEN: Unremarkable.    PANCREAS: Indistinct pancreatic head hypodensity, not able to be measured   (4-101).    ADRENAL GLANDS: Thickening of the left adrenal gland. Unremarkable right   adrenal gland.    KIDNEYS: Symmetric renal enhancement. No hydronephrosis. Bilateral renal   cysts and additional subcentimeter hypodensities, too small to   characterize.    ABDOMINOPELVIC NODES: Chip hepatis 1.5 cm lymph node and additional   para-aortic and paracaval lymph nodes..    PELVIC ORGANS: Unremarkable.    PERITONEUM/MESENTERY/BOWEL: No bowel obstruction, pneumoperitoneum or   ascites. Normal appendix. Colonic diverticulosis without acute   diverticulitis. Mild thickening of the descending portion of the duodenum   (4-126).    BONES/SOFT TISSUES: Degenerative changes of the spine. Grade 1   anterolisthesis of L4 on L5.    OTHER: Vascular calcifications.      IMPRESSION:    1. Indistinct pancreatic head hypodensity, possibly due to mild focal   interstitial edematous pancreatitis. In addition, there are subtle   findings of right upper quadrant inflammatory change including mild   gallbladder wall thickening and circumferential duodenal thickening that   may be seen with duodenitis or secondary to pancreatic inflammation.    If patient's symptomatology persists, follow-up CT with intravenous   contrast is recommended to further evaluate the pancreas.      2. Chip hepatis 1.5 cm lymph node and additional para-aortic and   paracaval lymph nodes, possibly reactive. Short-term follow-up with MRI   is recommended to exclude other etiologies.    --- End of Report ---          LORNE GOMEZ DO; Resident Radiologist  This document has been electronically signed.  MONTRELL VANEGAS MD; Attending Interventional Radiologist  This document has been electronically signed. Sep 22 2022  7:33PM    < end of copied text >         67yFemale  Being followed for acute pancreatitis   Interval history: Patient denies nausea, vomiting, hematemesis, melena, blood in stool, diarrhea, constipation, abdominal pain. Patient tolerating diet.      PAST MEDICAL & SURGICAL HISTORY:   Bipolar mood disorder      S/P tonsillectomy      H/O ovarian cystectomy                Social History: No smoking. No alcohol. No illegal drug use.            MEDICATIONS  (STANDING):  aMIOdarone Infusion 1 mG/Min (33.3 mL/Hr) IV Continuous <Continuous>  aMIOdarone Infusion 0.5 mG/Min (16.7 mL/Hr) IV Continuous <Continuous>  amoxicillin  875 milliGRAM(s)/clavulanate 1 Tablet(s) Oral two times a day  buPROPion XL (24-Hour) . 150 milliGRAM(s) Oral daily  diltiazem    milliGRAM(s) Oral daily  haloperidol     Tablet 2 milliGRAM(s) Oral daily  heparin   Injectable 5000 Unit(s) SubCutaneous every 12 hours  OXcarbazepine 150 milliGRAM(s) Oral two times a day  pantoprazole  Injectable 40 milliGRAM(s) IV Push daily  sodium chloride 0.9%. 1000 milliLiter(s) (125 mL/Hr) IV Continuous <Continuous>      fluticasone propionate 50 MICROgram(s)/spray Nasal Spray 1 Spray(s) Both Nostrils two times a day PRN nasal congestion  guaifenesin/dextromethorphan Oral Liquid 10 milliLiter(s) Oral every 6 hours PRN Cough  ibuprofen  Tablet. 400 milliGRAM(s) Oral four times a day PRN Mild Pain (1 - 3)  LORazepam     Tablet 0.5 milliGRAM(s) Oral every 12 hours PRN Anxiety  morphine  - Injectable 4 milliGRAM(s) IV Push every 4 hours PRN Severe Pain (7 - 10)  zolpidem 5 milliGRAM(s) Oral at bedtime PRN Insomnia  zolpidem 5 milliGRAM(s) Oral at bedtime PRN Insomnia      Allergies:    No Known Allergies            REVIEW OF SYSTEMS:  General:  No weight loss, fevers, or chills.  Eyes:  No reported pain or visual changes  ENT:  No sore throat or runny nose.  NECK: No stiffness   CV:  No chest pain or palpitations.  Resp:  No shortness of breath, cough  GI:  No abdominal pain, nausea, vomiting, dysphagia, diarrhea or constipation. No rectal bleeding, melena, or hematemesis.  Muscle:  No aches or weakness  Neuro:  No tingling, numbness     VITAL SIGNS:   T(F): 98 (09-27-22 @ 05:00), Max: 98 (09-27-22 @ 05:00)  HR: 79 (09-27-22 @ 07:21) (63 - 153)  BP: 113/59 (09-27-22 @ 05:00) (113/59 - 136/76)  RR: 16 (09-27-22 @ 07:21) (16 - 18)  SpO2: 97% (09-26-22 @ 12:51) (97% - 97%)    PHYSICAL EXAM:  GENERAL: AAOx3, no acute distress.  HEAD:  Atraumatic, Normocephalic  EYES: conjunctiva and sclera clear  NECK: Supple, no JVD or thyromegaly  CHEST/LUNG: Clear to auscultation bilaterally; No wheeze, rhonchi, or rales  HEART: Regular rate and rhythm; normal S1, S2, No murmurs.  ABDOMEN: Soft, nontender, nondistended; Bowel sounds present  NEUROLOGY: No asterixis or tremor.   SKIN: Intact, no jaundice            LABS:                IMAGING:    < from: CT Abdomen and Pelvis w/ IV Cont (09.22.22 @ 18:33) > This data was reviewed.     ACC: [id number] EXAM:  CT ABDOMEN AND PELVIS IC                          PROCEDURE DATE:  09/22/2022          INTERPRETATION:  CLINICAL STATEMENT: Epigastric pain  urinary symptoms.   WBC 8.87  Lipase 131    TECHNIQUE: Contiguous axial CT images wereobtained from the lower chest   to the pubic symphysis following the administration of 94 cc Omnipaque   350 intravenous contrast.  Oral contrast was administered.  Reformatted   images in the coronal and sagittal planes were acquired.    COMPARISON CT: None.    FINDINGS:    LOWER CHEST: Unremarkable.    HEPATOBILIARY: Mild gallbladder wall thickening, nonspecific.    SPLEEN: Unremarkable.    PANCREAS: Indistinct pancreatic head hypodensity, not able to be measured   (4-101).    ADRENAL GLANDS: Thickening of the left adrenal gland. Unremarkable right   adrenal gland.    KIDNEYS: Symmetric renal enhancement. No hydronephrosis. Bilateral renal   cysts and additional subcentimeter hypodensities, too small to   characterize.    ABDOMINOPELVIC NODES: Chip hepatis 1.5 cm lymph node and additional   para-aortic and paracaval lymph nodes..    PELVIC ORGANS: Unremarkable.    PERITONEUM/MESENTERY/BOWEL: No bowel obstruction, pneumoperitoneum or   ascites. Normal appendix. Colonic diverticulosis without acute   diverticulitis. Mild thickening of the descending portion of the duodenum   (4-126).    BONES/SOFT TISSUES: Degenerative changes of the spine. Grade 1   anterolisthesis of L4 on L5.    OTHER: Vascular calcifications.      IMPRESSION:    1. Indistinct pancreatic head hypodensity, possibly due to mild focal   interstitial edematous pancreatitis. In addition, there are subtle   findings of right upper quadrant inflammatory change including mild   gallbladder wall thickening and circumferential duodenal thickening that   may be seen with duodenitis or secondary to pancreatic inflammation.    If patient's symptomatology persists, follow-up CT with intravenous   contrast is recommended to further evaluate the pancreas.      2. Chip hepatis 1.5 cm lymph node and additional para-aortic and   paracaval lymph nodes, possibly reactive. Short-term follow-up with MRI   is recommended to exclude other etiologies.    --- End of Report ---          LORNE GOMEZ DO; Resident Radiologist  This document has been electronically signed.  MONTRELL VANEGAS MD; Attending Interventional Radiologist  This document has been electronically signed. Sep 22 2022  7:33PM    < end of copied text >

## 2022-09-27 NOTE — PROGRESS NOTE ADULT - ASSESSMENT
acute pancreatitis   Interval history: Patient denies nausea, vomiting, hematemesis, melena, blood in stool, diarrhea, constipation, abdominal pain. Patient tolerating diet.  A FIB WITH RVR  STARTED ON IV CARDIZEM  IV HEPAIRIN  CARDIOLOGY ON THE CASE    ACT PANCRITITIS  Pt comfortable and in no distress.  At this time would continue cardizem IVP Prn for HR >125.  send serial ce.  Cardio follow up in am.  consider ep eval.  Reconsult ICU prn.    CNS: continue bipolar meds

## 2022-09-28 ENCOUNTER — TRANSCRIPTION ENCOUNTER (OUTPATIENT)
Age: 67
End: 2022-09-28

## 2022-09-28 ENCOUNTER — RESULT REVIEW (OUTPATIENT)
Age: 67
End: 2022-09-28

## 2022-09-28 LAB
ALBUMIN SERPL ELPH-MCNC: 3.3 G/DL — LOW (ref 3.5–5.2)
ALP SERPL-CCNC: 175 U/L — HIGH (ref 30–115)
ALT FLD-CCNC: 115 U/L — HIGH (ref 0–41)
ANION GAP SERPL CALC-SCNC: 12 MMOL/L — SIGNIFICANT CHANGE UP (ref 7–14)
AST SERPL-CCNC: 30 U/L — SIGNIFICANT CHANGE UP (ref 0–41)
BILIRUB SERPL-MCNC: 0.5 MG/DL — SIGNIFICANT CHANGE UP (ref 0.2–1.2)
BUN SERPL-MCNC: 19 MG/DL — SIGNIFICANT CHANGE UP (ref 10–20)
CALCIUM SERPL-MCNC: 9.6 MG/DL — SIGNIFICANT CHANGE UP (ref 8.4–10.5)
CHLORIDE SERPL-SCNC: 113 MMOL/L — HIGH (ref 98–110)
CO2 SERPL-SCNC: 20 MMOL/L — SIGNIFICANT CHANGE UP (ref 17–32)
CREAT SERPL-MCNC: 1.2 MG/DL — SIGNIFICANT CHANGE UP (ref 0.7–1.5)
EGFR: 50 ML/MIN/1.73M2 — LOW
GLUCOSE SERPL-MCNC: 100 MG/DL — HIGH (ref 70–99)
HCT VFR BLD CALC: 33.9 % — LOW (ref 37–47)
HGB BLD-MCNC: 10.9 G/DL — LOW (ref 12–16)
MCHC RBC-ENTMCNC: 30.1 PG — SIGNIFICANT CHANGE UP (ref 27–31)
MCHC RBC-ENTMCNC: 32.2 G/DL — SIGNIFICANT CHANGE UP (ref 32–37)
MCV RBC AUTO: 93.6 FL — SIGNIFICANT CHANGE UP (ref 81–99)
NRBC # BLD: 0 /100 WBCS — SIGNIFICANT CHANGE UP (ref 0–0)
PLATELET # BLD AUTO: 368 K/UL — SIGNIFICANT CHANGE UP (ref 130–400)
POTASSIUM SERPL-MCNC: 4.2 MMOL/L — SIGNIFICANT CHANGE UP (ref 3.5–5)
POTASSIUM SERPL-SCNC: 4.2 MMOL/L — SIGNIFICANT CHANGE UP (ref 3.5–5)
PROT SERPL-MCNC: 5.3 G/DL — LOW (ref 6–8)
RBC # BLD: 3.62 M/UL — LOW (ref 4.2–5.4)
RBC # FLD: 13.3 % — SIGNIFICANT CHANGE UP (ref 11.5–14.5)
SODIUM SERPL-SCNC: 145 MMOL/L — SIGNIFICANT CHANGE UP (ref 135–146)
WBC # BLD: 8.16 K/UL — SIGNIFICANT CHANGE UP (ref 4.8–10.8)
WBC # FLD AUTO: 8.16 K/UL — SIGNIFICANT CHANGE UP (ref 4.8–10.8)

## 2022-09-28 PROCEDURE — 99233 SBSQ HOSP IP/OBS HIGH 50: CPT

## 2022-09-28 PROCEDURE — 88312 SPECIAL STAINS GROUP 1: CPT | Mod: 26

## 2022-09-28 PROCEDURE — 43237 ENDOSCOPIC US EXAM ESOPH: CPT

## 2022-09-28 PROCEDURE — 88305 TISSUE EXAM BY PATHOLOGIST: CPT | Mod: 26

## 2022-09-28 PROCEDURE — 43239 EGD BIOPSY SINGLE/MULTIPLE: CPT | Mod: XU

## 2022-09-28 RX ORDER — POLYETHYLENE GLYCOL 3350 17 G/17G
17 POWDER, FOR SOLUTION ORAL ONCE
Refills: 0 | Status: COMPLETED | OUTPATIENT
Start: 2022-09-28 | End: 2022-09-28

## 2022-09-28 RX ORDER — ACETAMINOPHEN 500 MG
650 TABLET ORAL ONCE
Refills: 0 | Status: COMPLETED | OUTPATIENT
Start: 2022-09-28 | End: 2022-09-28

## 2022-09-28 RX ADMIN — MORPHINE SULFATE 4 MILLIGRAM(S): 50 CAPSULE, EXTENDED RELEASE ORAL at 21:43

## 2022-09-28 RX ADMIN — POLYETHYLENE GLYCOL 3350 17 GRAM(S): 17 POWDER, FOR SOLUTION ORAL at 17:52

## 2022-09-28 RX ADMIN — PANTOPRAZOLE SODIUM 40 MILLIGRAM(S): 20 TABLET, DELAYED RELEASE ORAL at 17:44

## 2022-09-28 RX ADMIN — OXCARBAZEPINE 150 MILLIGRAM(S): 300 TABLET, FILM COATED ORAL at 17:43

## 2022-09-28 RX ADMIN — BUPROPION HYDROCHLORIDE 150 MILLIGRAM(S): 150 TABLET, EXTENDED RELEASE ORAL at 16:06

## 2022-09-28 RX ADMIN — Medication 650 MILLIGRAM(S): at 18:53

## 2022-09-28 RX ADMIN — Medication 1 TABLET(S): at 05:50

## 2022-09-28 RX ADMIN — OXCARBAZEPINE 150 MILLIGRAM(S): 300 TABLET, FILM COATED ORAL at 05:50

## 2022-09-28 RX ADMIN — Medication 0.5 MILLIGRAM(S): at 23:06

## 2022-09-28 RX ADMIN — HEPARIN SODIUM 5000 UNIT(S): 5000 INJECTION INTRAVENOUS; SUBCUTANEOUS at 17:52

## 2022-09-28 RX ADMIN — ZOLPIDEM TARTRATE 5 MILLIGRAM(S): 10 TABLET ORAL at 23:06

## 2022-09-28 RX ADMIN — MORPHINE SULFATE 4 MILLIGRAM(S): 50 CAPSULE, EXTENDED RELEASE ORAL at 21:09

## 2022-09-28 RX ADMIN — HALOPERIDOL DECANOATE 2 MILLIGRAM(S): 100 INJECTION INTRAMUSCULAR at 23:06

## 2022-09-28 RX ADMIN — Medication 1 TABLET(S): at 17:44

## 2022-09-28 NOTE — CONSULT NOTE ADULT - ASSESSMENT
#Acute Pancreatitis with GB sludge noted on EUS  - will review imaging with attending  - please contact Cardiology for risk stratification for possible lap donna

## 2022-09-28 NOTE — PROGRESS NOTE ADULT - SUBJECTIVE AND OBJECTIVE BOX
Patient is a 67y old  Female who presents with a chief complaint of urinary symptoms (27 Sep 2022 13:05)      OVERNIGHT EVENTS: no events reported by the house staff, no events on tele, pt was not seen as he was transferred to [facility] for UES    SUBJECTIVE / INTERVAL HPI: Patient seen and examined at bedside.     VITAL SIGNS:  Vital Signs Last 24 Hrs  T(C): 36.2 (28 Sep 2022 13:00), Max: 36.3 (27 Sep 2022 21:00)  T(F): 97.2 (28 Sep 2022 13:00), Max: 97.3 (27 Sep 2022 21:00)  HR: 61 (28 Sep 2022 13:15) (56 - 66)  BP: 150/77 (28 Sep 2022 13:15) (124/75 - 150/77)  BP(mean): --  RR: 19 (28 Sep 2022 13:15) (16 - 19)  SpO2: 99% (28 Sep 2022 13:15) (99% - 100%)    Parameters below as of 28 Sep 2022 13:15  Patient On (Oxygen Delivery Method): room air        PHYSICAL EXAM:        MEDICATIONS:  MEDICATIONS  (STANDING):  aMIOdarone Infusion 1 mG/Min (33.3 mL/Hr) IV Continuous <Continuous>  aMIOdarone Infusion 0.5 mG/Min (16.7 mL/Hr) IV Continuous <Continuous>  amoxicillin  875 milliGRAM(s)/clavulanate 1 Tablet(s) Oral two times a day  buPROPion XL (24-Hour) . 150 milliGRAM(s) Oral daily  diltiazem    milliGRAM(s) Oral daily  haloperidol     Tablet 2 milliGRAM(s) Oral daily  heparin   Injectable 5000 Unit(s) SubCutaneous every 12 hours  OXcarbazepine 150 milliGRAM(s) Oral two times a day  pantoprazole  Injectable 40 milliGRAM(s) IV Push daily  sodium chloride 0.9%. 1000 milliLiter(s) (125 mL/Hr) IV Continuous <Continuous>    MEDICATIONS  (PRN):  fluticasone propionate 50 MICROgram(s)/spray Nasal Spray 1 Spray(s) Both Nostrils two times a day PRN nasal congestion  guaifenesin/dextromethorphan Oral Liquid 10 milliLiter(s) Oral every 6 hours PRN Cough  ibuprofen  Tablet. 400 milliGRAM(s) Oral four times a day PRN Mild Pain (1 - 3)  LORazepam     Tablet 0.5 milliGRAM(s) Oral every 12 hours PRN Anxiety  morphine  - Injectable 4 milliGRAM(s) IV Push every 4 hours PRN Severe Pain (7 - 10)  zolpidem 5 milliGRAM(s) Oral at bedtime PRN Insomnia  zolpidem 5 milliGRAM(s) Oral at bedtime PRN Insomnia      ALLERGIES:  Allergies    No Known Allergies    Intolerances        LABS:                        10.9   8.16  )-----------( 368      ( 28 Sep 2022 06:55 )             33.9     09-28    145  |  113<H>  |  19  ----------------------------<  100<H>  4.2   |  20  |  1.2    Ca    9.6      28 Sep 2022 06:55    TPro  5.3<L>  /  Alb  3.3<L>  /  TBili  0.5  /  DBili  x   /  AST  30  /  ALT  115<H>  /  AlkPhos  175<H>  09-28    PT/INR - ( 27 Sep 2022 16:09 )   PT: 11.30 sec;   INR: 0.98 ratio         PTT - ( 27 Sep 2022 16:09 )  PTT:30.5 sec    < from: US Abdomen Upper Quadrant Right (09.22.22 @ 19:03) >    IMPRESSION:  Normal right upper quadrant abdominal ultrasound.    < end of copied text >  < from: CT Abdomen and Pelvis w/ IV Cont (09.22.22 @ 18:33) >  IMPRESSION:    1. Indistinct pancreatic head hypodensity, possibly due to mild focal   interstitial edematous pancreatitis. In addition, there are subtle   findings of right upper quadrant inflammatory change including mild   gallbladder wall thickening and circumferential duodenal thickening that   may be seen with duodenitis or secondary to pancreatic inflammation.    If patient's symptomatology persists, follow-up CT with intravenous     < end of copied text >  < from: CT Abdomen and Pelvis w/ IV Cont (09.22.22 @ 18:33) >  ABDOMINOPELVIC NODES: Chip hepatis 1.5 cm lymph node and additional   para-aortic and paracaval lymph nodes..    < end of copied text >

## 2022-09-28 NOTE — CONSULT NOTE ADULT - SUBJECTIVE AND OBJECTIVE BOX
HPI:  pt is a 66 yo f pmhx bipolar mood disorder presents to ER c/o urinary symptoms. pt states on Sunday she felt tired and having discomfort while urinating, presented to clinic and was sent home on nitrofurantion. Pt took antibiotics x 4 days with minimal relief. Presents today with low back pain, denies fever/chills. On lab work was found to have elevated lipase, elevated bilis.   (22 Sep 2022 22:55)    Patient presented to ED with complaints of suprapubic pressure, admitted to acute pancreatitis.  Underwent EUS today which showed GB sludge and surgery consulted.  Patient reports one episode of RUQ abdominal pain that lasted 30m 2 weeks prior to presentation  She does not remember if it was related to a meal.  No RUQ or Epigastric pain since then.  Patient reports she eats a healthy diet, not much fatty or fried food.  No previous Hx of GB stones or issues.  No previous Abdominal surgeries       PAST MEDICAL & SURGICAL HISTORY:  Bipolar mood disorder      S/P tonsillectomy      H/O ovarian cystectomy      MEDICATIONS  (STANDING):  amoxicillin  875 milliGRAM(s)/clavulanate 1 Tablet(s) Oral two times a day  buPROPion XL (24-Hour) . 150 milliGRAM(s) Oral daily  diltiazem    milliGRAM(s) Oral daily  haloperidol     Tablet 2 milliGRAM(s) Oral daily  heparin   Injectable 5000 Unit(s) SubCutaneous every 12 hours  OXcarbazepine 150 milliGRAM(s) Oral two times a day  pantoprazole  Injectable 40 milliGRAM(s) IV Push daily  sodium chloride 0.9%. 1000 milliLiter(s) (125 mL/Hr) IV Continuous <Continuous>    MEDICATIONS  (PRN):  fluticasone propionate 50 MICROgram(s)/spray Nasal Spray 1 Spray(s) Both Nostrils two times a day PRN nasal congestion  guaifenesin/dextromethorphan Oral Liquid 10 milliLiter(s) Oral every 6 hours PRN Cough  ibuprofen  Tablet. 400 milliGRAM(s) Oral four times a day PRN Mild Pain (1 - 3)  LORazepam     Tablet 0.5 milliGRAM(s) Oral every 12 hours PRN Anxiety  morphine  - Injectable 4 milliGRAM(s) IV Push every 4 hours PRN Severe Pain (7 - 10)  zolpidem 5 milliGRAM(s) Oral at bedtime PRN Insomnia  zolpidem 5 milliGRAM(s) Oral at bedtime PRN Insomnia      Allergies    No Known Allergies    Physical Exam:  General: NAD, resting comfortably  Abdominal: soft, ND/NT  Neuro: A/O x 3    Vital Signs Last 24 Hrs  T(C): 36.2 (28 Sep 2022 13:00), Max: 36.3 (27 Sep 2022 21:00)  T(F): 97.2 (28 Sep 2022 13:00), Max: 97.3 (27 Sep 2022 21:00)  HR: 61 (28 Sep 2022 13:15) (56 - 66)  BP: 150/77 (28 Sep 2022 13:15) (124/75 - 150/77)  BP(mean): --  RR: 19 (28 Sep 2022 13:15) (16 - 19)  SpO2: 99% (28 Sep 2022 13:15) (99% - 100%)    Parameters below as of 28 Sep 2022 13:15  Patient On (Oxygen Delivery Method): room air        I&O's Summary    27 Sep 2022 07:01  -  28 Sep 2022 07:00  --------------------------------------------------------  IN: 720 mL / OUT: 0 mL / NET: 720 mL    28 Sep 2022 07:01  -  28 Sep 2022 16:58  --------------------------------------------------------  IN: 0 mL / OUT: 200 mL / NET: -200 mL            LABS:                        10.9   8.16  )-----------( 368      ( 28 Sep 2022 06:55 )             33.9     09-28    145  |  113<H>  |  19  ----------------------------<  100<H>  4.2   |  20  |  1.2    Ca    9.6      28 Sep 2022 06:55    TPro  5.3<L>  /  Alb  3.3<L>  /  TBili  0.5  /  DBili  x   /  AST  30  /  ALT  115<H>  /  AlkPhos  175<H>  09-28    PT/INR - ( 27 Sep 2022 16:09 )   PT: 11.30 sec;   INR: 0.98 ratio         PTT - ( 27 Sep 2022 16:09 )  PTT:30.5 sec    CAPILLARY BLOOD GLUCOSE        LIVER FUNCTIONS - ( 28 Sep 2022 06:55 )  Alb: 3.3 g/dL / Pro: 5.3 g/dL / ALK PHOS: 175 U/L / ALT: 115 U/L / AST: 30 U/L / GGT: x             RADIOLOGY & ADDITIONAL STUDIES:    RUQ Sono  IMPRESSION:  Normal right upper quadrant abdominal ultrasound.      CT A/P w/IV    IMPRESSION:    1. Indistinct pancreatic head hypodensity, possibly due to mild focal   interstitial edematous pancreatitis. In addition, there are subtle   findings of right upper quadrant inflammatory change including mild   gallbladder wall thickening and circumferential duodenal thickening that   may be seen with duodenitis or secondary to pancreatic inflammation.    If patient's symptomatology persists, follow-up CT with intravenous   contrast is recommended to further evaluate the pancreas.      2. Chip hepatis 1.5 cm lymph node and additional para-aortic and   paracaval lymph nodes, possibly reactive. Short-term follow-up with MRI   is recommended to exclude other etiologies.

## 2022-09-28 NOTE — PROGRESS NOTE ADULT - ASSESSMENT
#acute pancreatitis:  - no GS on images  - normal Ca and TG  - for EUS today  - c/w IVF    #SVT: controlled on cardizem    #Chip hepatis 1.5 cm lymph node and additional para-aortic and   paracaval lymph nodes, possibly reactive. Short-term follow-up with MRI   is recommended to exclude other etiologies.    #DALE: on IVF, resolved   #DVT ppx

## 2022-09-28 NOTE — CHART NOTE - NSCHARTNOTEFT_GEN_A_CORE
Impressions:  	Acute pancreatitis.  	Pancreatic head cyst 1.6 x 1.3 cm with mild PD dilation r/o MD IPMN.  	Gallbladder Sludge.    Plan  Advance diet as tolerated   Await pathology results.      Surfery for CCY     OP MRI MRCP pancreas in 2 months     OP repeat EUS +/- FNA of pancreatic cyst    Follow-up visit in 2-4 weeks in my private office    A full detailed report will be posted in Results  -for questions text me on  teams, call [phone] on weekdays before 5pm or call GI service at [phone]  after 5 pm and on weekends

## 2022-09-29 PROCEDURE — 99233 SBSQ HOSP IP/OBS HIGH 50: CPT

## 2022-09-29 RX ORDER — POLYETHYLENE GLYCOL 3350 17 G/17G
17 POWDER, FOR SOLUTION ORAL ONCE
Refills: 0 | Status: COMPLETED | OUTPATIENT
Start: 2022-09-29 | End: 2022-09-29

## 2022-09-29 RX ORDER — ACETAMINOPHEN 500 MG
650 TABLET ORAL EVERY 6 HOURS
Refills: 0 | Status: DISCONTINUED | OUTPATIENT
Start: 2022-09-29 | End: 2022-09-30

## 2022-09-29 RX ORDER — LACTULOSE 10 G/15ML
20 SOLUTION ORAL ONCE
Refills: 0 | Status: COMPLETED | OUTPATIENT
Start: 2022-09-29 | End: 2022-09-29

## 2022-09-29 RX ORDER — METHOCARBAMOL 500 MG/1
500 TABLET, FILM COATED ORAL ONCE
Refills: 0 | Status: COMPLETED | OUTPATIENT
Start: 2022-09-29 | End: 2022-09-29

## 2022-09-29 RX ADMIN — Medication 650 MILLIGRAM(S): at 15:48

## 2022-09-29 RX ADMIN — OXCARBAZEPINE 150 MILLIGRAM(S): 300 TABLET, FILM COATED ORAL at 05:22

## 2022-09-29 RX ADMIN — Medication 1 TABLET(S): at 05:21

## 2022-09-29 RX ADMIN — Medication 650 MILLIGRAM(S): at 21:48

## 2022-09-29 RX ADMIN — LACTULOSE 20 GRAM(S): 10 SOLUTION ORAL at 13:10

## 2022-09-29 RX ADMIN — Medication 0.5 MILLIGRAM(S): at 23:47

## 2022-09-29 RX ADMIN — Medication 120 MILLIGRAM(S): at 05:22

## 2022-09-29 RX ADMIN — HEPARIN SODIUM 5000 UNIT(S): 5000 INJECTION INTRAVENOUS; SUBCUTANEOUS at 05:22

## 2022-09-29 RX ADMIN — SODIUM CHLORIDE 75 MILLILITER(S): 9 INJECTION INTRAMUSCULAR; INTRAVENOUS; SUBCUTANEOUS at 16:51

## 2022-09-29 RX ADMIN — PANTOPRAZOLE SODIUM 40 MILLIGRAM(S): 20 TABLET, DELAYED RELEASE ORAL at 13:10

## 2022-09-29 RX ADMIN — BUPROPION HYDROCHLORIDE 150 MILLIGRAM(S): 150 TABLET, EXTENDED RELEASE ORAL at 13:12

## 2022-09-29 RX ADMIN — HALOPERIDOL DECANOATE 2 MILLIGRAM(S): 100 INJECTION INTRAMUSCULAR at 21:06

## 2022-09-29 RX ADMIN — MORPHINE SULFATE 4 MILLIGRAM(S): 50 CAPSULE, EXTENDED RELEASE ORAL at 23:20

## 2022-09-29 RX ADMIN — METHOCARBAMOL 500 MILLIGRAM(S): 500 TABLET, FILM COATED ORAL at 20:28

## 2022-09-29 RX ADMIN — HEPARIN SODIUM 5000 UNIT(S): 5000 INJECTION INTRAVENOUS; SUBCUTANEOUS at 17:16

## 2022-09-29 RX ADMIN — POLYETHYLENE GLYCOL 3350 17 GRAM(S): 17 POWDER, FOR SOLUTION ORAL at 20:28

## 2022-09-29 RX ADMIN — Medication 650 MILLIGRAM(S): at 16:16

## 2022-09-29 RX ADMIN — OXCARBAZEPINE 150 MILLIGRAM(S): 300 TABLET, FILM COATED ORAL at 17:17

## 2022-09-29 RX ADMIN — SODIUM CHLORIDE 75 MILLILITER(S): 9 INJECTION INTRAMUSCULAR; INTRAVENOUS; SUBCUTANEOUS at 19:09

## 2022-09-29 RX ADMIN — Medication 1 TABLET(S): at 17:17

## 2022-09-29 RX ADMIN — ZOLPIDEM TARTRATE 5 MILLIGRAM(S): 10 TABLET ORAL at 23:47

## 2022-09-29 NOTE — PROGRESS NOTE ADULT - NS ATTEND AMEND GEN_ALL_CORE FT
Patient with Acute Pancreatitis. S/P EUS results noted Pancreatic head cyst . To be followed up as outpatient.
I edited the note.

## 2022-09-29 NOTE — PROGRESS NOTE ADULT - ATTENDING COMMENTS
above noted discussed case with surgical resident, DR Marti and pt abdomen soft no distension for surgery in am as per DR hull from GI

## 2022-09-29 NOTE — PROGRESS NOTE ADULT - ASSESSMENT
67yFemale pmh bipolar mood disorder on macrobid for UTI presents for mild epigastric pain and back pain. Currently patient feeling much better.      Problem 1-Acute pancreatitis 2/3 clinical picture and CT imaging show acute pancreatitis : Acute illness with systemic symptoms   Indistinct pancreatic head hypodensity, possibly due to mild focal   interstitial edematous pancreatitis. In addition, there are subtle   findings of right upper quadrant inflammatory change including mild   gallbladder wall thickening and circumferential duodenal thickening that   may be seen with duodenitis or secondary to pancreatic inflammation.  If patient's symptomatology persists, follow-up CT with intravenous   contrast is recommended to further evaluate the pancreas.  transaminitis   s/p EUS  Acute pancreatitis.  Pancreatic head cyst 1.6 x 1.3 cm with mild PD dilation r/o MD IPMN  Gallbladder Sludge.  -Advance diet as tolerated   -Await pathology results.    -OP MRI MRCP pancreas in 2 months    OP repeat EUS +/- FNA of pancreatic cyst    - Follow up with our GI office located on [address] [place] [id number], Phone number [phone] with Dr. Rivas  - low fat diet  -Monitor LFTs daily   -surgical follow-up      Problem 2-Transaminitis  normal caliber CBD  ddx r/o CBD stone, possibly DILI  Rec  s/p EUS  Acute pancreatitis.  Pancreatic head cyst 1.6 x 1.3 cm with mild PD dilation r/o MD IPMN  Gallbladder Sludge.  - Follow up with our GI office located on [address] [place] [address], Phone number [phone] with Dr. Rivas  -Avoid hepatotoxic medications  -Monitor LFTs daily, LFTs improving     Problem 3- Chip hepatis 1.5 cm lymph node and additional para-aortic and   paracaval lymph nodes, possibly reactive. Short-term follow-up with MRI   is recommended to exclude other etiologies.  Rec  - Care as per primary team

## 2022-09-29 NOTE — PROGRESS NOTE ADULT - SUBJECTIVE AND OBJECTIVE BOX
67yFemale  Being followed for acute pancreatitis   Interval history: Patient denies nausea, vomiting, hematemesis, melena, blood in stool, diarrhea, constipation, abdominal pain. Patient feeling 100% better, tolerating diet, s/p EUS.      PAST MEDICAL & SURGICAL HISTORY:   Bipolar mood disorder      S/P tonsillectomy      H/O ovarian cystectomy                Social History: No smoking. No alcohol. No illegal drug use.            MEDICATIONS  (STANDING):  amoxicillin  875 milliGRAM(s)/clavulanate 1 Tablet(s) Oral two times a day  buPROPion XL (24-Hour) . 150 milliGRAM(s) Oral daily  diltiazem    milliGRAM(s) Oral daily  haloperidol     Tablet 2 milliGRAM(s) Oral daily  heparin   Injectable 5000 Unit(s) SubCutaneous every 12 hours  OXcarbazepine 150 milliGRAM(s) Oral two times a day  pantoprazole  Injectable 40 milliGRAM(s) IV Push daily  sodium chloride 0.9%. 1000 milliLiter(s) (125 mL/Hr) IV Continuous <Continuous>    MEDICATIONS  (PRN):  fluticasone propionate 50 MICROgram(s)/spray Nasal Spray 1 Spray(s) Both Nostrils two times a day PRN nasal congestion  guaifenesin/dextromethorphan Oral Liquid 10 milliLiter(s) Oral every 6 hours PRN Cough  ibuprofen  Tablet. 400 milliGRAM(s) Oral four times a day PRN Mild Pain (1 - 3)  LORazepam     Tablet 0.5 milliGRAM(s) Oral every 12 hours PRN Anxiety  morphine  - Injectable 4 milliGRAM(s) IV Push every 4 hours PRN Severe Pain (7 - 10)  zolpidem 5 milliGRAM(s) Oral at bedtime PRN Insomnia  zolpidem 5 milliGRAM(s) Oral at bedtime PRN Insomnia      Allergies:   No Known Allergies            REVIEW OF SYSTEMS:  General:  No weight loss, fevers, or chills.  Eyes:  No reported pain or visual changes  ENT:  No sore throat or runny nose.  NECK: No stiffness   CV:  No chest pain or palpitations.  Resp:  No shortness of breath, cough  GI:  No abdominal pain, nausea, vomiting, dysphagia, diarrhea or constipation. No rectal bleeding, melena, or hematemesis.  Neuro:  No tingling, numbness         VITAL SIGNS:   T(F): 97.5 (09-29-22 @ 04:15), Max: 97.5 (09-29-22 @ 04:15)  HR: 62 (09-29-22 @ 04:15) (60 - 62)  BP: 129/71 (09-29-22 @ 04:15) (124/75 - 150/77)  RR: 16 (09-29-22 @ 04:15) (16 - 19)  SpO2: 99% (09-28-22 @ 13:15) (99% - 100%)    PHYSICAL EXAM:  GENERAL: AAOx3, no acute distress.  HEAD:  Atraumatic, Normocephalic  EYES: conjunctiva and sclera clear  NECK: Supple, no JVD or thyromegaly  CHEST/LUNG: Clear to auscultation bilaterally; No wheeze, rhonchi, or rales  HEART: Regular rate and rhythm; normal S1, S2, No murmurs.  ABDOMEN: Soft, nontender, nondistended; Bowel sounds present  NEUROLOGY: No asterixis or tremor.   SKIN: Intact, no jaundice            LABS:                        10.9   8.16  )-----------( 368      ( 28 Sep 2022 06:55 )             33.9     09-28    145  |  113<H>  |  19  ----------------------------<  100<H>  4.2   |  20  |  1.2    Ca    9.6      28 Sep 2022 06:55    TPro  5.3<L>  /  Alb  3.3<L>  /  TBili  0.5  /  DBili  x   /  AST  30  /  ALT  115<H>  /  AlkPhos  175<H>  09-28    LIVER FUNCTIONS - ( 28 Sep 2022 06:55 )  Alb: 3.3 g/dL / Pro: 5.3 g/dL / ALK PHOS: 175 U/L / ALT: 115 U/L / AST: 30 U/L / GGT: x           PT/INR - ( 27 Sep 2022 16:09 )   PT: 11.30 sec;   INR: 0.98 ratio         PTT - ( 27 Sep 2022 16:09 )  PTT:30.5 sec    IMAGING:  < from: CT Abdomen and Pelvis w/ IV Cont (09.22.22 @ 18:33) >    ACC: [id number] EXAM:  CT ABDOMEN AND PELVIS IC                          PROCEDURE DATE:  09/22/2022          INTERPRETATION:  CLINICAL STATEMENT: Epigastric pain  urinary symptoms.   WBC 8.87  Lipase 131    TECHNIQUE: Contiguous axial CT images wereobtained from the lower chest   to the pubic symphysis following the administration of 94 cc Omnipaque   350 intravenous contrast.  Oral contrast was administered.  Reformatted   images in the coronal and sagittal planes were acquired.    COMPARISON CT: None.    FINDINGS:    LOWER CHEST: Unremarkable.    HEPATOBILIARY: Mild gallbladder wall thickening, nonspecific.    SPLEEN: Unremarkable.    PANCREAS: Indistinct pancreatic head hypodensity, not able to be measured   (4-101).    ADRENAL GLANDS: Thickening of the left adrenal gland. Unremarkable right   adrenal gland.    KIDNEYS: Symmetric renal enhancement. No hydronephrosis. Bilateral renal   cysts and additional subcentimeter hypodensities, too small to   characterize.    ABDOMINOPELVIC NODES: Chip hepatis 1.5 cm lymph node and additional   para-aortic and paracaval lymph nodes..    PELVIC ORGANS: Unremarkable.    PERITONEUM/MESENTERY/BOWEL: No bowel obstruction, pneumoperitoneum or   ascites. Normal appendix. Colonic diverticulosis without acute   diverticulitis. Mild thickening of the descending portion of the duodenum   (4-126).    BONES/SOFT TISSUES: Degenerative changes of the spine. Grade 1   anterolisthesis of L4 on L5.    OTHER: Vascular calcifications.      IMPRESSION:    1. Indistinct pancreatic head hypodensity, possibly due to mild focal   interstitial edematous pancreatitis. In addition, there are subtle   findings of right upper quadrant inflammatory change including mild   gallbladder wall thickening and circumferential duodenal thickening that   may be seen with duodenitis or secondary to pancreatic inflammation.    If patient's symptomatology persists, follow-up CT with intravenous   contrast is recommended to further evaluate the pancreas.      2. Chip hepatis 1.5 cm lymph node and additional para-aortic and   paracaval lymph nodes, possibly reactive. Short-term follow-up with MRI   is recommended to exclude other etiologies.    --- End of Report ---          LOREN GOMEZ DO; Resident Radiologist  This document has been electronically signed.  MONTRELL VANEGAS MD; Attending Interventional Radiologist  This document has been electronically signed. Sep 22 2022  7:33PM    < end of copied text >      Impressions:  	Acute pancreatitis.  	Pancreatic head cyst 1.6 x 1.3 cm with mild PD dilation r/o MD IPMN.  	Gallbladder Sludge.    Plan  Advance diet as tolerated   Await pathology results.       Surfery for CCY      OP MRI MRCP pancreas in 2 months      OP repeat EUS +/- FNA of pancreatic cyst    Follow-up visit in 2-4 weeks in my private office    A full detailed report will be posted in Results  -for questions text me on Mc teams, call [phone] on weekdays before 5pm or call GI service at [phone]  after 5 pm and on weekends.

## 2022-09-29 NOTE — PROGRESS NOTE ADULT - SUBJECTIVE AND OBJECTIVE BOX
Subjective/Objective:   68 y/o f who presented with urinary symptoms found to have acute pancreatitis with gallbladder sludge, periods of svt this admission controlled on cardizem. Surgery following plans for possible cholecystectomy requesting cardiac clearance     Pt evaluated at bedside. Pt appears comfortable, no acute complaints. Ambulatory without device, able to walk >2blocks without exertional chest pain or SOB,     MEDICATIONS  (STANDING):  amoxicillin  875 milliGRAM(s)/clavulanate 1 Tablet(s) Oral two times a day  buPROPion XL (24-Hour) . 150 milliGRAM(s) Oral daily  diltiazem    milliGRAM(s) Oral daily  haloperidol     Tablet 2 milliGRAM(s) Oral daily  heparin   Injectable 5000 Unit(s) SubCutaneous every 12 hours  OXcarbazepine 150 milliGRAM(s) Oral two times a day  pantoprazole  Injectable 40 milliGRAM(s) IV Push daily  sodium chloride 0.9%. 1000 milliLiter(s) (75 mL/Hr) IV Continuous <Continuous>    MEDICATIONS  (PRN):  acetaminophen     Tablet .. 650 milliGRAM(s) Oral every 6 hours PRN Temp greater or equal to 38C (100.4F), Mild Pain (1 - 3)  fluticasone propionate 50 MICROgram(s)/spray Nasal Spray 1 Spray(s) Both Nostrils two times a day PRN nasal congestion  guaifenesin/dextromethorphan Oral Liquid 10 milliLiter(s) Oral every 6 hours PRN Cough  ibuprofen  Tablet. 400 milliGRAM(s) Oral four times a day PRN Mild Pain (1 - 3)  LORazepam     Tablet 0.5 milliGRAM(s) Oral every 12 hours PRN Anxiety  morphine  - Injectable 4 milliGRAM(s) IV Push every 4 hours PRN Severe Pain (7 - 10)  zolpidem 5 milliGRAM(s) Oral at bedtime PRN Insomnia  zolpidem 5 milliGRAM(s) Oral at bedtime PRN Insomnia          Vital Signs Last 24 Hrs  T(C): 36 (29 Sep 2022 14:09), Max: 36.4 (29 Sep 2022 04:15)  T(F): 96.8 (29 Sep 2022 14:09), Max: 97.5 (29 Sep 2022 04:15)  HR: 61 (29 Sep 2022 14:09) (61 - 62)  BP: 124/69 (29 Sep 2022 14:09) (124/69 - 139/65)  BP(mean): --  RR: 16 (29 Sep 2022 14:09) (16 - 16)  SpO2: --      I&O's Detail    28 Sep 2022 07:01  -  29 Sep 2022 07:00  --------------------------------------------------------  IN:  Total IN: 0 mL    OUT:    Voided (mL): 200 mL  Total OUT: 200 mL    Total NET: -200 mL      GENERAL:  66y/o Female NAD, resting comfortably.  HEAD:  Atraumatic, Normocephalic  EYES: EOMI, PERRLA, conjunctiva and sclera clear  NECK: Supple, No JVD, no cervical lymphadenopathy, non-tender  CHEST/LUNG: Clear to auscultation bilaterally; No wheeze, rhonchi, or rales  HEART: Regular rate and rhythm; S1&S2  ABDOMEN: Soft, Nontender, Nondistended x 4 quadrants; Bowel sounds present  EXTREMITIES:   Peripheral Pulses Present, No clubbing, no cyanosis, or no edema, no calf tenderness  PSYCH: AAOx3, cooperative, appropriate  NEUROLOGY: WNL  SKIN: WNL      EKG/ TELEM:    LABS:                          10.9   8.16  )-----------( 368      ( 28 Sep 2022 06:55 )             33.9       28 Sep 2022 06:55    145    |  113<H>  |  19     ----------------------------<  100<H>  4.2     |  20     |  1.2      Ca    9.6        28 Sep 2022 06:55    TPro  5.3<L>  /  Alb  3.3<L>  /  TBili  0.5    /  DBili  x      /  AST  30     /  ALT  115<H>  /  AlkPhos  175<H>  28 Sep 2022 06:55                      Diagnostic testing:  < from: TTE Echo Complete w/o Contrast w/ Doppler (09.26.22 @ 09:36) >  Summary:   1. Left ventricular ejection fraction, by visual estimation, is 65 to   70%.   2. Normal left ventricular size and wall thicknesses, with normal   systolic and diastolic function.    < end of copied text >        Assessment and Plan:         Subjective/Objective:   66 y/o f who presented with urinary symptoms found to have acute pancreatitis with gallbladder sludge, periods of svt this admission controlled on cardizem. Surgery following plans for possible lap donna requesting cardiac clearance     Pt evaluated at bedside. Pt appears comfortable, no acute complaints. Ambulatory without device, able to walk >2blocks without exertional chest pain or SOB,     MEDICATIONS  (STANDING):  amoxicillin  875 milliGRAM(s)/clavulanate 1 Tablet(s) Oral two times a day  buPROPion XL (24-Hour) . 150 milliGRAM(s) Oral daily  diltiazem    milliGRAM(s) Oral daily  haloperidol     Tablet 2 milliGRAM(s) Oral daily  heparin   Injectable 5000 Unit(s) SubCutaneous every 12 hours  OXcarbazepine 150 milliGRAM(s) Oral two times a day  pantoprazole  Injectable 40 milliGRAM(s) IV Push daily  sodium chloride 0.9%. 1000 milliLiter(s) (75 mL/Hr) IV Continuous <Continuous>    MEDICATIONS  (PRN):  acetaminophen     Tablet .. 650 milliGRAM(s) Oral every 6 hours PRN Temp greater or equal to 38C (100.4F), Mild Pain (1 - 3)  fluticasone propionate 50 MICROgram(s)/spray Nasal Spray 1 Spray(s) Both Nostrils two times a day PRN nasal congestion  guaifenesin/dextromethorphan Oral Liquid 10 milliLiter(s) Oral every 6 hours PRN Cough  ibuprofen  Tablet. 400 milliGRAM(s) Oral four times a day PRN Mild Pain (1 - 3)  LORazepam     Tablet 0.5 milliGRAM(s) Oral every 12 hours PRN Anxiety  morphine  - Injectable 4 milliGRAM(s) IV Push every 4 hours PRN Severe Pain (7 - 10)  zolpidem 5 milliGRAM(s) Oral at bedtime PRN Insomnia  zolpidem 5 milliGRAM(s) Oral at bedtime PRN Insomnia          Vital Signs Last 24 Hrs  T(C): 36 (29 Sep 2022 14:09), Max: 36.4 (29 Sep 2022 04:15)  T(F): 96.8 (29 Sep 2022 14:09), Max: 97.5 (29 Sep 2022 04:15)  HR: 61 (29 Sep 2022 14:09) (61 - 62)  BP: 124/69 (29 Sep 2022 14:09) (124/69 - 139/65)  BP(mean): --  RR: 16 (29 Sep 2022 14:09) (16 - 16)  SpO2: --      I&O's Detail    28 Sep 2022 07:01  -  29 Sep 2022 07:00  --------------------------------------------------------  IN:  Total IN: 0 mL    OUT:    Voided (mL): 200 mL  Total OUT: 200 mL    Total NET: -200 mL      GENERAL:  68y/o Female NAD, resting comfortably.  HEAD:  Atraumatic, Normocephalic  EYES: EOMI, PERRLA, conjunctiva and sclera clear  NECK: Supple, No JVD, no cervical lymphadenopathy, non-tender  CHEST/LUNG: Clear to auscultation bilaterally; No wheeze, rhonchi, or rales  HEART: Regular rate and rhythm; S1&S2  ABDOMEN: Soft, Nontender, Nondistended x 4 quadrants; Bowel sounds present  EXTREMITIES:   Peripheral Pulses Present, No clubbing, no cyanosis, or no edema, no calf tenderness  PSYCH: AAOx3, cooperative, appropriate  NEUROLOGY: WNL  SKIN: WNL      EKG/ TELEM:    LABS:                          10.9   8.16  )-----------( 368      ( 28 Sep 2022 06:55 )             33.9       28 Sep 2022 06:55    145    |  113<H>  |  19     ----------------------------<  100<H>  4.2     |  20     |  1.2      Ca    9.6        28 Sep 2022 06:55    TPro  5.3<L>  /  Alb  3.3<L>  /  TBili  0.5    /  DBili  x      /  AST  30     /  ALT  115<H>  /  AlkPhos  175<H>  28 Sep 2022 06:55                      Diagnostic testing:  < from: TTE Echo Complete w/o Contrast w/ Doppler (09.26.22 @ 09:36) >  Summary:   1. Left ventricular ejection fraction, by visual estimation, is 65 to   70%.   2. Normal left ventricular size and wall thicknesses, with normal   systolic and diastolic function.    < end of copied text >        Assessment and Plan:

## 2022-09-29 NOTE — PROGRESS NOTE ADULT - ASSESSMENT
ASSESSMENT:  Acute Pancreatitis with GB sludge noted on EUS.     PLAN:  -Obtain medical risk stratification for lap donna   - Obtain cardiac risk stratification   - NPO at midnight   - full set of labs this evening (CBC, BMP, Mg, Phos, Type and screen, Covid)  - Possible lap donna 9/30 pending OR schedule       Surgery" [id number]   ASSESSMENT:  Acute Pancreatitis with GB sludge noted on EUS.     PLAN:  -Obtain medical risk stratification for lap donna   - Obtain cardiac risk stratification   - NPO at midnight   - full set of labs this evening (CBC, BMP, Mg, Phos, Type and screen, Covid)  -  lap donna 9/30       Surgery" [id number]

## 2022-09-29 NOTE — PROGRESS NOTE ADULT - ASSESSMENT
#acute pancreatitis likley GS related with sludge found on EUS  - s/p EUS  - normal Ca and TG  - c/w IVF  - advance to regular diet  - CCY per surgery   - cardiac risk stratification     #SVT: controlled on cardizem    #Chip hepatis 1.5 cm lymph node and additional para-aortic and   paracaval lymph nodes, possibly reactive. Short-term follow-up with MRI   is recommended to exclude other etiologies.    #DALE: on IVF, resolved   #DVT ppx   #acute pancreatitis likley GS related with sludge found on EUS  - s/p EUS  - normal Ca and TG  - c/w IVF  - advance to regular diet  - CCY per surgery   - cardiac risk stratification per cardio  - pt is medically optimized for CCY, pt is not septic or toxic and not experiences any cardiac symptoms, METS score > 4, RCRI zero,   kow to moderate risk for intermediate risk procedure     #SVT: controlled on cardizem    #Chip hepatis 1.5 cm lymph node and additional para-aortic and   paracaval lymph nodes, possibly reactive. Short-term follow-up with MRI   is recommended to exclude other etiologies.    #DALE: on IVF, resolved   #DVT ppx   #acute pancreatitis likley GS related with sludge found on EUS  - s/p EUS  - normal Ca and TG  - c/w IVF  - advance to regular diet  - CCY per surgery   - cardiac risk stratification per cardio  - pt is medically optimized for CCY, pt is not septic or toxic and not experiences any cardiac symptoms, METS score > 4, RCRI 1,   intermediate risk for intermediate risk procedure     #SVT: controlled on cardizem    #Chip hepatis 1.5 cm lymph node and additional para-aortic and   paracaval lymph nodes, possibly reactive. Short-term follow-up with MRI   is recommended to exclude other etiologies.    #DALE: on IVF, resolved   #DVT ppx

## 2022-09-29 NOTE — PROGRESS NOTE ADULT - SUBJECTIVE AND OBJECTIVE BOX
Patient is a 67y old  Female who presents with a chief complaint of urinary symptoms (29 Sep 2022 10:38)      OVERNIGHT EVENTS: s/p EUS, feels fine with no pain or fever    SUBJECTIVE / INTERVAL HPI: Patient seen and examined at bedside.     VITAL SIGNS:  Vital Signs Last 24 Hrs  T(C): 36.4 (29 Sep 2022 04:15), Max: 36.4 (29 Sep 2022 04:15)  T(F): 97.5 (29 Sep 2022 04:15), Max: 97.5 (29 Sep 2022 04:15)  HR: 62 (29 Sep 2022 04:15) (60 - 62)  BP: 129/71 (29 Sep 2022 04:15) (124/75 - 150/77)  BP(mean): --  RR: 16 (29 Sep 2022 04:15) (16 - 19)  SpO2: 99% (28 Sep 2022 13:15) (99% - 100%)    Parameters below as of 28 Sep 2022 13:15  Patient On (Oxygen Delivery Method): room air        PHYSICAL EXAM:    General: WDWN  HEENT: NC/AT; PERRL, clear conjunctiva  Neck: supple  Cardiovascular: +S1/S2; RRR  Respiratory: CTA b/l; no W/R/R  Gastrointestinal: soft, NT/ND; +BSx4  Extremities: WWP; 2+ peripheral pulses; no edema   Neurological: AAOx3; no focal deficits    MEDICATIONS:  MEDICATIONS  (STANDING):  amoxicillin  875 milliGRAM(s)/clavulanate 1 Tablet(s) Oral two times a day  buPROPion XL (24-Hour) . 150 milliGRAM(s) Oral daily  diltiazem    milliGRAM(s) Oral daily  haloperidol     Tablet 2 milliGRAM(s) Oral daily  heparin   Injectable 5000 Unit(s) SubCutaneous every 12 hours  lactulose Syrup 20 Gram(s) Oral once  OXcarbazepine 150 milliGRAM(s) Oral two times a day  pantoprazole  Injectable 40 milliGRAM(s) IV Push daily  sodium chloride 0.9%. 1000 milliLiter(s) (125 mL/Hr) IV Continuous <Continuous>    MEDICATIONS  (PRN):  fluticasone propionate 50 MICROgram(s)/spray Nasal Spray 1 Spray(s) Both Nostrils two times a day PRN nasal congestion  guaifenesin/dextromethorphan Oral Liquid 10 milliLiter(s) Oral every 6 hours PRN Cough  ibuprofen  Tablet. 400 milliGRAM(s) Oral four times a day PRN Mild Pain (1 - 3)  LORazepam     Tablet 0.5 milliGRAM(s) Oral every 12 hours PRN Anxiety  morphine  - Injectable 4 milliGRAM(s) IV Push every 4 hours PRN Severe Pain (7 - 10)  zolpidem 5 milliGRAM(s) Oral at bedtime PRN Insomnia  zolpidem 5 milliGRAM(s) Oral at bedtime PRN Insomnia      ALLERGIES:  Allergies    No Known Allergies    Intolerances        LABS:                        10.9   8.16  )-----------( 368      ( 28 Sep 2022 06:55 )             33.9     09-28    145  |  113<H>  |  19  ----------------------------<  100<H>  4.2   |  20  |  1.2    Ca    9.6      28 Sep 2022 06:55    TPro  5.3<L>  /  Alb  3.3<L>  /  TBili  0.5  /  DBili  x   /  AST  30  /  ALT  115<H>  /  AlkPhos  175<H>  09-28    PT/INR - ( 27 Sep 2022 16:09 )   PT: 11.30 sec;   INR: 0.98 ratio         PTT - ( 27 Sep 2022 16:09 )  PTT:30.5 sec    CAPILLARY BLOOD GLUCOSE      Pancreatic head cyst 1.6 x 1.3 cm with mild PD dilation r/o MD IPMN.  	Gallbladder Sludge.

## 2022-09-29 NOTE — PROVIDER CONTACT NOTE (OTHER) - SITUATION
Pt c/o neck pain, unrelieved by tylenol administered earlier.  Pt also c/o constipation.  Lactulose given today with no results.

## 2022-09-29 NOTE — PROGRESS NOTE ADULT - SUBJECTIVE AND OBJECTIVE BOX
GENERAL SURGERY PROGRESS NOTE    Patient: NICKO EDGAR , 67y ([birth year])Female   MRN: [id number]  Location: [address] [place] [address]  Visit: 09-22-22 Inpatient  Date: 09-29-22 @ 10:21    Procedure/Dx/Injuries: Gall bladder sludge     Events of past 24 hours: No acute events. Patient denies ever experiencing pain upon consuming fatty or spicy food or any RUQ pain. Per EUS, gallbladder filled with sludge.     PAST MEDICAL & SURGICAL HISTORY:  Bipolar mood disorder      S/P tonsillectomy      H/O ovarian cystectomy          Vitals:   T(F): 97.5 (09-29-22 @ 04:15), Max: 97.5 (09-29-22 @ 04:15)  HR: 62 (09-29-22 @ 04:15)  BP: 129/71 (09-29-22 @ 04:15)  RR: 16 (09-29-22 @ 04:15)  SpO2: 99% (09-28-22 @ 13:15)      Diet, Clear Liquid:   Fiber/Residue Restricted  Diet, NPO after Midnight:      NPO Start Date: 27-Sep-2022,   NPO Start Time: 23:59  Except Medications      Fluids:     I & O's:    09-28-22 @ 07:01  -  09-29-22 @ 07:00  --------------------------------------------------------  IN:  Total IN: 0 mL    OUT:    Voided (mL): 200 mL  Total OUT: 200 mL    Total NET: -200 mL      PHYSICAL EXAM:  General: NAD, AAOx3, calm and cooperative  HEENT: NCAT, FLYNN, EOMI, Trachea ML, Neck supple  Cardiac: RRR S1, S2, no Murmurs, rubs or gallops  Respiratory: CTAB, normal respiratory effort, breath sounds equal BL, no wheeze, rhonchi or crackles  Abdomen: Soft, non-distended, non-tender, no rebound, no guarding.   Skin: Warm/dry, normal color, no jaundice    MEDICATIONS  (STANDING):  amoxicillin  875 milliGRAM(s)/clavulanate 1 Tablet(s) Oral two times a day  buPROPion XL (24-Hour) . 150 milliGRAM(s) Oral daily  diltiazem    milliGRAM(s) Oral daily  haloperidol     Tablet 2 milliGRAM(s) Oral daily  heparin   Injectable 5000 Unit(s) SubCutaneous every 12 hours  OXcarbazepine 150 milliGRAM(s) Oral two times a day  pantoprazole  Injectable 40 milliGRAM(s) IV Push daily  sodium chloride 0.9%. 1000 milliLiter(s) (125 mL/Hr) IV Continuous <Continuous>    MEDICATIONS  (PRN):  fluticasone propionate 50 MICROgram(s)/spray Nasal Spray 1 Spray(s) Both Nostrils two times a day PRN nasal congestion  guaifenesin/dextromethorphan Oral Liquid 10 milliLiter(s) Oral every 6 hours PRN Cough  ibuprofen  Tablet. 400 milliGRAM(s) Oral four times a day PRN Mild Pain (1 - 3)  LORazepam     Tablet 0.5 milliGRAM(s) Oral every 12 hours PRN Anxiety  morphine  - Injectable 4 milliGRAM(s) IV Push every 4 hours PRN Severe Pain (7 - 10)  zolpidem 5 milliGRAM(s) Oral at bedtime PRN Insomnia  zolpidem 5 milliGRAM(s) Oral at bedtime PRN Insomnia      DVT PROPHYLAXIS: heparin   Injectable 5000 Unit(s) SubCutaneous every 12 hours    GI PROPHYLAXIS: pantoprazole  Injectable 40 milliGRAM(s) IV Push daily    ANTICOAGULATION:   ANTIBIOTICS:  amoxicillin  875 milliGRAM(s)/clavulanate 1 Tablet(s)            LAB/STUDIES:  Labs:  CAPILLARY BLOOD GLUCOSE                              10.9   8.16  )-----------( 368      ( 28 Sep 2022 06:55 )             33.9         09-28    145  |  113<H>  |  19  ----------------------------<  100<H>  4.2   |  20  |  1.2          LFTs:             5.3  | 0.5  | 30       ------------------[175     ( 28 Sep 2022 06:55 )  3.3  | x    | 115         Lipase:x      Amylase:x             Coags:     11.30  ----< 0.98    ( 27 Sep 2022 16:09 )     30.5

## 2022-09-29 NOTE — PROGRESS NOTE ADULT - ASSESSMENT
68 y/o f who presented with urinary symptoms found to have acute pancreatitis with gallbladder sludge, periods of svt this admission controlled on cardizem. Surgery following plans for possible cholecystectomy requesting cardiac clearance     Impression  risk stratification for Cholecystectomy    Plan  - currently hd stable, no chest pain or sob  - ecg 09/26 nsr non-specific t wave abnormality  - periods of svt vs accelerated junctional rhythm  this admission controlled on po Cardizem  - CE normal  - TTE EF 65-70% unremarkable  - RCRI 1 point class II; 30-Day risk of BRENDA 6.0%   - functional capacity >4METS  - Intermediate risk for solo-op MACE in Cholecystectomy  - can consider outpatient holter monitor  - other workup per primary team       66 y/o f who presented with urinary symptoms found to have acute pancreatitis with gallbladder sludge, periods of svt this admission controlled on cardizem. Surgery following plans for possible lap cholecystectomy requesting cardiac clearance     Impression  risk stratification for Lap Cholecystectomy    Plan  - currently hd stable, no chest pain or sob  - ecg 09/26 nsr non-specific t wave abnormality  - periods of svt vs accelerated junctional rhythm  this admission controlled on po Cardizem  - CE normal  - TTE EF 65-70% unremarkable  - RCRI 1 point class II; 30-Day risk of BRENDA 6.0%   - functional capacity >4METS  - Intermediate risk for solo-op MACE in Lap cholecystectomy  - can consider outpatient holter monitor  - other workup per primary team

## 2022-09-30 ENCOUNTER — RESULT REVIEW (OUTPATIENT)
Age: 67
End: 2022-09-30

## 2022-09-30 ENCOUNTER — TRANSCRIPTION ENCOUNTER (OUTPATIENT)
Age: 67
End: 2022-09-30

## 2022-09-30 LAB
ALBUMIN SERPL ELPH-MCNC: 3.5 G/DL — SIGNIFICANT CHANGE UP (ref 3.5–5.2)
ALP SERPL-CCNC: 157 U/L — HIGH (ref 30–115)
ALT FLD-CCNC: 71 U/L — HIGH (ref 0–41)
ANION GAP SERPL CALC-SCNC: 9 MMOL/L — SIGNIFICANT CHANGE UP (ref 7–14)
AST SERPL-CCNC: 17 U/L — SIGNIFICANT CHANGE UP (ref 0–41)
BASOPHILS # BLD AUTO: 0.16 K/UL — SIGNIFICANT CHANGE UP (ref 0–0.2)
BASOPHILS NFR BLD AUTO: 2 % — HIGH (ref 0–1)
BILIRUB SERPL-MCNC: 0.5 MG/DL — SIGNIFICANT CHANGE UP (ref 0.2–1.2)
BLD GP AB SCN SERPL QL: SIGNIFICANT CHANGE UP
BUN SERPL-MCNC: 14 MG/DL — SIGNIFICANT CHANGE UP (ref 10–20)
CALCIUM SERPL-MCNC: 9.7 MG/DL — SIGNIFICANT CHANGE UP (ref 8.4–10.5)
CHLORIDE SERPL-SCNC: 110 MMOL/L — SIGNIFICANT CHANGE UP (ref 98–110)
CO2 SERPL-SCNC: 25 MMOL/L — SIGNIFICANT CHANGE UP (ref 17–32)
CREAT SERPL-MCNC: 1 MG/DL — SIGNIFICANT CHANGE UP (ref 0.7–1.5)
EGFR: 63 ML/MIN/1.73M2 — SIGNIFICANT CHANGE UP
EOSINOPHIL # BLD AUTO: 0.8 K/UL — HIGH (ref 0–0.7)
EOSINOPHIL NFR BLD AUTO: 10 % — HIGH (ref 0–8)
GLUCOSE SERPL-MCNC: 88 MG/DL — SIGNIFICANT CHANGE UP (ref 70–99)
HCT VFR BLD CALC: 34.9 % — LOW (ref 37–47)
HGB BLD-MCNC: 10.7 G/DL — LOW (ref 12–16)
IMM GRANULOCYTES NFR BLD AUTO: 0.5 % — HIGH (ref 0.1–0.3)
LYMPHOCYTES # BLD AUTO: 2.21 K/UL — SIGNIFICANT CHANGE UP (ref 1.2–3.4)
LYMPHOCYTES # BLD AUTO: 27.7 % — SIGNIFICANT CHANGE UP (ref 20.5–51.1)
MAGNESIUM SERPL-MCNC: 1.8 MG/DL — SIGNIFICANT CHANGE UP (ref 1.8–2.4)
MCHC RBC-ENTMCNC: 29.2 PG — SIGNIFICANT CHANGE UP (ref 27–31)
MCHC RBC-ENTMCNC: 30.7 G/DL — LOW (ref 32–37)
MCV RBC AUTO: 95.4 FL — SIGNIFICANT CHANGE UP (ref 81–99)
MONOCYTES # BLD AUTO: 0.59 K/UL — SIGNIFICANT CHANGE UP (ref 0.1–0.6)
MONOCYTES NFR BLD AUTO: 7.4 % — SIGNIFICANT CHANGE UP (ref 1.7–9.3)
NEUTROPHILS # BLD AUTO: 4.18 K/UL — SIGNIFICANT CHANGE UP (ref 1.4–6.5)
NEUTROPHILS NFR BLD AUTO: 52.4 % — SIGNIFICANT CHANGE UP (ref 42.2–75.2)
NRBC # BLD: 0 /100 WBCS — SIGNIFICANT CHANGE UP (ref 0–0)
PLATELET # BLD AUTO: 417 K/UL — HIGH (ref 130–400)
POTASSIUM SERPL-MCNC: 4.5 MMOL/L — SIGNIFICANT CHANGE UP (ref 3.5–5)
POTASSIUM SERPL-SCNC: 4.5 MMOL/L — SIGNIFICANT CHANGE UP (ref 3.5–5)
PROT SERPL-MCNC: 5.4 G/DL — LOW (ref 6–8)
RBC # BLD: 3.66 M/UL — LOW (ref 4.2–5.4)
RBC # FLD: 13.2 % — SIGNIFICANT CHANGE UP (ref 11.5–14.5)
SODIUM SERPL-SCNC: 144 MMOL/L — SIGNIFICANT CHANGE UP (ref 135–146)
WBC # BLD: 7.98 K/UL — SIGNIFICANT CHANGE UP (ref 4.8–10.8)
WBC # FLD AUTO: 7.98 K/UL — SIGNIFICANT CHANGE UP (ref 4.8–10.8)

## 2022-09-30 PROCEDURE — 99233 SBSQ HOSP IP/OBS HIGH 50: CPT

## 2022-09-30 PROCEDURE — 88304 TISSUE EXAM BY PATHOLOGIST: CPT | Mod: 26

## 2022-09-30 RX ORDER — DILTIAZEM HCL 120 MG
120 CAPSULE, EXT RELEASE 24 HR ORAL DAILY
Refills: 0 | Status: DISCONTINUED | OUTPATIENT
Start: 2022-09-30 | End: 2022-10-01

## 2022-09-30 RX ORDER — FLUTICASONE PROPIONATE 50 MCG
1 SPRAY, SUSPENSION NASAL
Refills: 0 | Status: DISCONTINUED | OUTPATIENT
Start: 2022-09-30 | End: 2022-10-01

## 2022-09-30 RX ORDER — PANTOPRAZOLE SODIUM 20 MG/1
40 TABLET, DELAYED RELEASE ORAL DAILY
Refills: 0 | Status: DISCONTINUED | OUTPATIENT
Start: 2022-09-30 | End: 2022-10-01

## 2022-09-30 RX ORDER — GUAIFENESIN/DEXTROMETHORPHAN 600MG-30MG
10 TABLET, EXTENDED RELEASE 12 HR ORAL EVERY 6 HOURS
Refills: 0 | Status: DISCONTINUED | OUTPATIENT
Start: 2022-09-30 | End: 2022-10-01

## 2022-09-30 RX ORDER — ONDANSETRON 8 MG/1
4 TABLET, FILM COATED ORAL ONCE
Refills: 0 | Status: DISCONTINUED | OUTPATIENT
Start: 2022-09-30 | End: 2022-10-01

## 2022-09-30 RX ORDER — SODIUM CHLORIDE 9 MG/ML
1000 INJECTION, SOLUTION INTRAVENOUS
Refills: 0 | Status: DISCONTINUED | OUTPATIENT
Start: 2022-09-30 | End: 2022-09-30

## 2022-09-30 RX ORDER — OXYCODONE AND ACETAMINOPHEN 5; 325 MG/1; MG/1
1 TABLET ORAL ONCE
Refills: 0 | Status: DISCONTINUED | OUTPATIENT
Start: 2022-09-30 | End: 2022-09-30

## 2022-09-30 RX ORDER — ZOLPIDEM TARTRATE 10 MG/1
0 TABLET ORAL
Qty: 0 | Refills: 0 | DISCHARGE

## 2022-09-30 RX ORDER — HEPARIN SODIUM 5000 [USP'U]/ML
5000 INJECTION INTRAVENOUS; SUBCUTANEOUS EVERY 12 HOURS
Refills: 0 | Status: DISCONTINUED | OUTPATIENT
Start: 2022-09-30 | End: 2022-10-01

## 2022-09-30 RX ORDER — BUPROPION HYDROCHLORIDE 150 MG/1
150 TABLET, EXTENDED RELEASE ORAL DAILY
Refills: 0 | Status: DISCONTINUED | OUTPATIENT
Start: 2022-09-30 | End: 2022-10-01

## 2022-09-30 RX ORDER — ZOLPIDEM TARTRATE 10 MG/1
5 TABLET ORAL AT BEDTIME
Refills: 0 | Status: DISCONTINUED | OUTPATIENT
Start: 2022-09-30 | End: 2022-10-01

## 2022-09-30 RX ORDER — MORPHINE SULFATE 50 MG/1
2 CAPSULE, EXTENDED RELEASE ORAL EVERY 4 HOURS
Refills: 0 | Status: DISCONTINUED | OUTPATIENT
Start: 2022-09-30 | End: 2022-10-01

## 2022-09-30 RX ORDER — HALOPERIDOL DECANOATE 100 MG/ML
2 INJECTION INTRAMUSCULAR DAILY
Refills: 0 | Status: DISCONTINUED | OUTPATIENT
Start: 2022-09-30 | End: 2022-10-01

## 2022-09-30 RX ORDER — OXCARBAZEPINE 300 MG/1
150 TABLET, FILM COATED ORAL
Refills: 0 | Status: DISCONTINUED | OUTPATIENT
Start: 2022-09-30 | End: 2022-10-01

## 2022-09-30 RX ORDER — ONDANSETRON 8 MG/1
4 TABLET, FILM COATED ORAL ONCE
Refills: 0 | Status: DISCONTINUED | OUTPATIENT
Start: 2022-09-30 | End: 2022-09-30

## 2022-09-30 RX ORDER — MORPHINE SULFATE 50 MG/1
2 CAPSULE, EXTENDED RELEASE ORAL
Refills: 0 | Status: DISCONTINUED | OUTPATIENT
Start: 2022-09-30 | End: 2022-09-30

## 2022-09-30 RX ORDER — ACETAMINOPHEN 500 MG
650 TABLET ORAL EVERY 6 HOURS
Refills: 0 | Status: DISCONTINUED | OUTPATIENT
Start: 2022-09-30 | End: 2022-10-01

## 2022-09-30 RX ORDER — HYDROMORPHONE HYDROCHLORIDE 2 MG/ML
0.5 INJECTION INTRAMUSCULAR; INTRAVENOUS; SUBCUTANEOUS
Refills: 0 | Status: DISCONTINUED | OUTPATIENT
Start: 2022-09-30 | End: 2022-09-30

## 2022-09-30 RX ADMIN — Medication 120 MILLIGRAM(S): at 18:24

## 2022-09-30 RX ADMIN — MORPHINE SULFATE 2 MILLIGRAM(S): 50 CAPSULE, EXTENDED RELEASE ORAL at 20:18

## 2022-09-30 RX ADMIN — OXYCODONE AND ACETAMINOPHEN 1 TABLET(S): 5; 325 TABLET ORAL at 19:10

## 2022-09-30 RX ADMIN — ZOLPIDEM TARTRATE 5 MILLIGRAM(S): 10 TABLET ORAL at 23:08

## 2022-09-30 RX ADMIN — Medication 650 MILLIGRAM(S): at 23:08

## 2022-09-30 RX ADMIN — OXYCODONE AND ACETAMINOPHEN 1 TABLET(S): 5; 325 TABLET ORAL at 18:24

## 2022-09-30 RX ADMIN — HALOPERIDOL DECANOATE 2 MILLIGRAM(S): 100 INJECTION INTRAMUSCULAR at 23:08

## 2022-09-30 RX ADMIN — Medication 0.5 MILLIGRAM(S): at 23:08

## 2022-09-30 RX ADMIN — OXCARBAZEPINE 150 MILLIGRAM(S): 300 TABLET, FILM COATED ORAL at 06:06

## 2022-09-30 RX ADMIN — HYDROMORPHONE HYDROCHLORIDE 0.5 MILLIGRAM(S): 2 INJECTION INTRAMUSCULAR; INTRAVENOUS; SUBCUTANEOUS at 15:43

## 2022-09-30 RX ADMIN — Medication 1 TABLET(S): at 06:06

## 2022-09-30 RX ADMIN — Medication 120 MILLIGRAM(S): at 06:06

## 2022-09-30 NOTE — PROGRESS NOTE ADULT - SUBJECTIVE AND OBJECTIVE BOX
GENERAL SURGERY PROGRESS NOTE    Patient: NICKO EDGAR , 67y ([birth year])Female   MRN: [id number]  Location: [facility] [address] [place] [address]  Visit: 09-22-22 Inpatient  Date: 09-30-22 @ 10:06    Procedure/Dx/Injuries: Gallbladder sludge found on EUS     Events of past 24 hours: No acute events. Patient is aware she is going to OR today for lap donna.     PAST MEDICAL & SURGICAL HISTORY:  Bipolar mood disorder      S/P tonsillectomy      H/O ovarian cystectomy          Vitals:   T(F): 97.8 (09-30-22 @ 05:18), Max: 97.9 (09-29-22 @ 21:00)  HR: 57 (09-30-22 @ 05:18)  BP: 125/72 (09-30-22 @ 05:18)  RR: 16 (09-30-22 @ 05:18)  SpO2: --      Diet, NPO:   Except Medications      PHYSICAL EXAM:  General: NAD, AAOx3, calm and cooperative  HEENT: NCAT, FLYNN, EOMI, Trachea ML, Neck supple  Cardiac: RRR S1, S2, no Murmurs, rubs or gallops  Respiratory: CTAB, normal respiratory effort, breath sounds equal BL, no wheeze, rhonchi or crackles  Abdomen: Soft, non-distended, non-tender, no rebound, no guarding.       MEDICATIONS  (STANDING):  amoxicillin  875 milliGRAM(s)/clavulanate 1 Tablet(s) Oral two times a day  buPROPion XL (24-Hour) . 150 milliGRAM(s) Oral daily  diltiazem    milliGRAM(s) Oral daily  haloperidol     Tablet 2 milliGRAM(s) Oral daily  heparin   Injectable 5000 Unit(s) SubCutaneous every 12 hours  OXcarbazepine 150 milliGRAM(s) Oral two times a day  pantoprazole  Injectable 40 milliGRAM(s) IV Push daily  sodium chloride 0.9%. 1000 milliLiter(s) (75 mL/Hr) IV Continuous <Continuous>    MEDICATIONS  (PRN):  acetaminophen     Tablet .. 650 milliGRAM(s) Oral every 6 hours PRN Temp greater or equal to 38C (100.4F), Mild Pain (1 - 3)  fluticasone propionate 50 MICROgram(s)/spray Nasal Spray 1 Spray(s) Both Nostrils two times a day PRN nasal congestion  guaifenesin/dextromethorphan Oral Liquid 10 milliLiter(s) Oral every 6 hours PRN Cough  ibuprofen  Tablet. 400 milliGRAM(s) Oral four times a day PRN Mild Pain (1 - 3)      DVT PROPHYLAXIS: heparin   Injectable 5000 Unit(s) SubCutaneous every 12 hours    GI PROPHYLAXIS: pantoprazole  Injectable 40 milliGRAM(s) IV Push daily    ANTICOAGULATION:   ANTIBIOTICS:  amoxicillin  875 milliGRAM(s)/clavulanate 1 Tablet(s)            LAB/STUDIES:  Labs:  CAPILLARY BLOOD GLUCOSE                              10.7   7.98  )-----------( 417      ( 30 Sep 2022 07:05 )             34.9       Auto Neutrophil %: 52.4 % (09-30-22 @ 07:05)  Auto Immature Granulocyte %: 0.5 % (09-30-22 @ 07:05)    09-30    144  |  110  |  14  ----------------------------<  88  4.5   |  25  |  1.0      Calcium, Total Serum: 9.7 mg/dL (09-30-22 @ 07:05)      LFTs:             5.4  | 0.5  | 17       ------------------[157     ( 30 Sep 2022 07:05 )  3.5  | x    | 71          Lipase:x      Amylase:x

## 2022-09-30 NOTE — DISCHARGE NOTE PROVIDER - NSDCMRMEDTOKEN_GEN_ALL_CORE_FT
BUPROPION HCL  MG TABLET: TAKE ONE TABLET PER ORAL ROUTE ONCE A DAY  FLUCONAZOLE 150 MG TABLET:   HALOPERIDOL 2 MG TABLET:   LORAZEPAM 0.5 MG TABLET:   OXCARBAZEPINE 150 MG TABLET:   Trileptal 150 mg oral tablet: orally once a day   BUPROPION HCL  MG TABLET: 1 tab(s) orally once a day  dilTIAZem 120 mg/24 hours oral capsule, extended release: 1 cap(s) orally once a day  haloperidol 2 mg oral tablet: 1 tab(s) orally once a day  LORazepam 0.5 mg oral tablet: 1 tab(s) orally 2 times a day, As needed, Anxiety  OXcarbazepine 150 mg oral tablet: 1 tab(s) orally 2 times a day  polyethylene glycol 3350 oral powder for reconstitution: 17 gram(s) orally once a day  Tylenol 325 mg oral capsule: 2 cap(s) orally 3 times a day, As Needed for pain relief  zolpidem 5 mg oral tablet: 1 tab(s) orally once a day (at bedtime), As needed, Insomnia

## 2022-09-30 NOTE — CHART NOTE - NSCHARTNOTEFT_GEN_A_CORE
PACU ANESTHESIA ADMISSION NOTE      Procedure: Laparoscopic cholecystec    Laparoscopic cholecystectomy      Post op diagnosis:  Gallbladder sludge        ____  Intubated  TV:______       Rate: ______      FiO2: ______    _x___  Patent Airway    _x___  Full return of protective reflexes    _x___  Full recovery from anesthesia / back to baseline status    Vitals:  T(C): 37  HR: 57  BP: 131/71 (09-30-22 @ 15:33) (125/72 - 143/69)  RR: 16 (09-30-22 @ 15:33) (16 - 16)  SpO2: 97% (09-30-22 @ 15:33) (97% - 99%)    Mental Status:  _x___ Awake   _____ Alert   _____ Drowsy   _____ Sedated    Nausea/Vomiting:  _x___  NO       ______Yes,   See Post - Op Orders         Pain Scale (0-10):  __0___    Treatment: _x___ None    ____ See Post - Op/PCA Orders    Post - Operative Fluids:   __x__ Oral   ____ See Post - Op Orders    Plan: Discharge:   ____Home       ___X__Floor     _____Critical Care    _____  Other:_________________    Comments:  No anesthesia issues or complications noted.  Discharge when criteria met.

## 2022-09-30 NOTE — PROGRESS NOTE ADULT - SUBJECTIVE AND OBJECTIVE BOX
Patient is a 67y old  Female who presents with a chief complaint of urinary symptoms (30 Sep 2022 11:43)      OVERNIGHT EVENTS: remained stable, CCY today    SUBJECTIVE / INTERVAL HPI: Patient seen and examined at bedside.     VITAL SIGNS:  Vital Signs Last 24 Hrs  T(C): 36.3 (30 Sep 2022 16:28), Max: 36.6 (29 Sep 2022 21:00)  T(F): 97.4 (30 Sep 2022 16:28), Max: 97.9 (29 Sep 2022 21:00)  HR: 59 (30 Sep 2022 16:28) (56 - 64)  BP: 135/78 (30 Sep 2022 16:28) (125/72 - 145/77)  BP(mean): --  RR: 16 (30 Sep 2022 16:28) (16 - 16)  SpO2: 97% (30 Sep 2022 16:28) (96% - 99%)    Parameters below as of 30 Sep 2022 16:28  Patient On (Oxygen Delivery Method): nasal cannula  O2 Flow (L/min): 3      PHYSICAL EXAM:    General: WDWN  HEENT: NC/AT; PERRL, clear conjunctiva  Neck: supple  Cardiovascular: +S1/S2; RRR  Respiratory: CTA b/l; no W/R/R  Gastrointestinal: soft, NT/ND; +BSx4  Extremities: WWP; 2+ peripheral pulses; no edema   Neurological: AAOx3; no focal deficits    MEDICATIONS:  MEDICATIONS  (STANDING):  lactated ringers. 1000 milliLiter(s) (75 mL/Hr) IV Continuous <Continuous>    MEDICATIONS  (PRN):  HYDROmorphone  Injectable 0.5 milliGRAM(s) IV Push every 10 minutes PRN Severe Pain (7 - 10)  ibuprofen  Tablet. 400 milliGRAM(s) Oral four times a day PRN Mild Pain (1 - 3)  morphine  - Injectable 2 milliGRAM(s) IV Push every 15 minutes PRN Moderate Pain (4 - 6)  ondansetron Injectable 4 milliGRAM(s) IV Push once PRN Nausea and/or Vomiting  oxycodone    5 mG/acetaminophen 325 mG 1 Tablet(s) Oral once PRN Mild Pain (1 - 3)      ALLERGIES:  Allergies    No Known Allergies    Intolerances        LABS:                        10.7   7.98  )-----------( 417      ( 30 Sep 2022 07:05 )             34.9     09-30    144  |  110  |  14  ----------------------------<  88  4.5   |  25  |  1.0    Ca    9.7      30 Sep 2022 07:05  Mg     1.8     09-30    TPro  5.4<L>  /  Alb  3.5  /  TBili  0.5  /  DBili  x   /  AST  17  /  ALT  71<H>  /  AlkPhos  157<H>  09-30

## 2022-09-30 NOTE — DISCHARGE NOTE PROVIDER - PROVIDER TOKENS
PROVIDER:[TOKEN:[[id number]],FOLLOWUP:[2 weeks]] PROVIDER:[TOKEN:[[id number]],FOLLOWUP:[2 weeks]],PROVIDER:[TOKEN:[[id number]],FOLLOWUP:[2 weeks]]

## 2022-09-30 NOTE — PROGRESS NOTE ADULT - ASSESSMENT
ASSESSMENT:  Acute Pancreatitis with GB sludge noted on EUS.     PLAN:  - OR today for lap donna   - NPO except meds   - IVF   - Intermediate risk per Cardiology and medicine   - Management per medical team       Surgery: [id number]

## 2022-09-30 NOTE — BRIEF OPERATIVE NOTE - OPERATION/FINDINGS
cystic duct isolated and clipped along with artery. Hemostasis achieved and patient incision sites closed.

## 2022-09-30 NOTE — PROGRESS NOTE ADULT - SUBJECTIVE AND OBJECTIVE BOX
66 y/o female  with  gb  sludge  s/p  lap. cholecystectomy  seen and examined  in bed  no complaints  v/s t 96/ bp 136/69, hr 57, rr 16  PE; ABD + BS  SOFT  AXOX3  DRESSINGS  CDI    + VOID  A&P  S/P LAP. CHOLECYSTECTOMY  ON LOW FAT DIET  PAIN MGMT  WILL FOLLOW

## 2022-09-30 NOTE — CHART NOTE - NSCHARTNOTEFT_GEN_A_CORE
Patient is s/p laparoscopic cholecystectomy. Patient can be on regular diet today and if stable overnight can be discharged tomorrow if cleared by medicine. Surgery to see patient tomorrow. No antibiotics needed.

## 2022-09-30 NOTE — PROGRESS NOTE ADULT - ASSESSMENT
67yFemale pmh bipolar mood disorder on macrobid for UTI presents for mild epigastric pain and back pain. Currently patient feeling much better.      Problem 1-Acute pancreatitis 2/3 clinical picture and CT imaging show acute pancreatitis : Acute illness with systemic symptoms   Indistinct pancreatic head hypodensity, possibly due to mild focal   interstitial edematous pancreatitis. In addition, there are subtle   findings of right upper quadrant inflammatory change including mild   gallbladder wall thickening and circumferential duodenal thickening that   may be seen with duodenitis or secondary to pancreatic inflammation.  If patient's symptomatology persists, follow-up CT with intravenous   contrast is recommended to further evaluate the pancreas.  transaminitis   s/p EUS  Acute pancreatitis.  Pancreatic head cyst 1.6 x 1.3 cm with mild PD dilation r/o MD IPMN  Gallbladder Sludge.  -Advance diet as tolerated   -Await pathology results.    -OP MRI MRCP pancreas in 2 months    OP repeat EUS +/- FNA of pancreatic cyst    - Follow up with our GI office located on [address] [place] [address], Phone number [phone] with Dr. Rivas  - low fat diet  -Monitor LFTs daily   -surgical follow-up, plan for OR today      Problem 2-Transaminitis  normal caliber CBD  ddx r/o CBD stone, possibly DILI  Rec  s/p EUS  Acute pancreatitis.  Pancreatic head cyst 1.6 x 1.3 cm with mild PD dilation r/o MD IPMN  Gallbladder Sludge.  - Follow up with our GI office located on [address] [place] [address], Phone number [phone] with Dr. Rivas  -Avoid hepatotoxic medications  -Monitor LFTs daily, LFTs improving     Problem 3- Chip hepatis 1.5 cm lymph node and additional para-aortic and   paracaval lymph nodes, possibly reactive. Short-term follow-up with MRI   is recommended to exclude other etiologies.  Rec  - Care as per primary team       Recall GI if needed

## 2022-09-30 NOTE — DISCHARGE NOTE PROVIDER - HOSPITAL COURSE
pt is a 68 yo f pmhx bipolar mood disorder presents to ER c/o urinary symptoms. pt states on Sunday she felt tired and having discomfort while urinating, presented to clinic and was sent home on nitrofurantion. Pt took antibiotics x 4 days with minimal relief. Presents today with low back pain, denies fever/chills. On lab work was found to have elevated lipase, elevated bilis.       # UTI  - treated with Amoxicillin    # SVT   - acute cardiac event ruled out  - Cardiology consulted: rate controlled with Cardizem CD    # Acute Pancreatitis with GB sludge noted on EUS.     - OR 9/30/2022 for lap donna     # DALE  -resolved pt is a 68 yo f pmhx bipolar mood disorder presents to ER c/o urinary symptoms. pt states on Sunday she felt tired and having discomfort while urinating, presented to clinic and was sent home on nitrofurantion. Pt took antibiotics x 4 days with minimal relief. Presents today with low back pain, denies fever/chills. On lab work was found to have elevated lipase, elevated bilis.       # UTI  - treated with Amoxicillin    # SVT   - acute cardiac event ruled out  - Cardiology consulted: rate controlled with Cardizem CD    # Acute Pancreatitis with GB sludge noted on EUS.     - s/p IVF and CCY    # DALE  -resolved    #Chip hepatis 1.5 cm lymph node and additional para-aortic and   paracaval lymph nodes, possibly reactive. Short-term follow-up with MRI   is recommended to exclude other etiologies.

## 2022-09-30 NOTE — DISCHARGE NOTE PROVIDER - CARE PROVIDER_API CALL
Elise Wong)  Surgery  [address] [place] [address]  Phone: [phone]  Fax: [phone]  Follow Up Time: 2 weeks   Elise Wong)  Surgery  [address] [place] [address]  Phone: [phone]  Fax: [phone]  Follow Up Time: 2 weeks    Arron Hunter)  Cardiac Electrophysiology; Cardiovascular Disease; [facility]  [address] [place] [address]  Phone: [phone]  Fax: [phone]  Follow Up Time: 2 weeks

## 2022-09-30 NOTE — DISCHARGE NOTE PROVIDER - CARE PROVIDERS DIRECT ADDRESSES
,DirectAddress_Unknown ,DirectAddress_Unknown,[email or web address]

## 2022-09-30 NOTE — PROGRESS NOTE ADULT - ASSESSMENT
#acute pancreatitis likley GS related with sludge found on EUS  - s/p EUS  - normal Ca and TG  - CCY per surgery   - cardiac risk stratification per cardio      #SVT: controlled on cardizem    #Chip hepatis 1.5 cm lymph node and additional para-aortic and   paracaval lymph nodes, possibly reactive. Short-term follow-up with MRI   is recommended to exclude other etiologies.    #DALE: on IVF, resolved   #DVT ppx

## 2022-09-30 NOTE — PROGRESS NOTE ADULT - SUBJECTIVE AND OBJECTIVE BOX
67yFemale  Being followed for altered LFTs, acute pancreatitis, gallbladder sludge  Interval history: Patient denies nausea, vomiting, hematemesis, melena, blood in stool, diarrhea, constipation, abdominal pain. Patient s/p EUS for OR today.      PAST MEDICAL & SURGICAL HISTORY:   Bipolar mood disorder      S/P tonsillectomy      H/O ovarian cystectomy                Social History: No smoking. No alcohol. No illegal drug use.            MEDICATIONS  (STANDING):  amoxicillin  875 milliGRAM(s)/clavulanate 1 Tablet(s) Oral two times a day  buPROPion XL (24-Hour) . 150 milliGRAM(s) Oral daily  diltiazem    milliGRAM(s) Oral daily  haloperidol     Tablet 2 milliGRAM(s) Oral daily  heparin   Injectable 5000 Unit(s) SubCutaneous every 12 hours  OXcarbazepine 150 milliGRAM(s) Oral two times a day  pantoprazole  Injectable 40 milliGRAM(s) IV Push daily  sodium chloride 0.9%. 1000 milliLiter(s) (75 mL/Hr) IV Continuous <Continuous>    MEDICATIONS  (PRN):  acetaminophen     Tablet .. 650 milliGRAM(s) Oral every 6 hours PRN Temp greater or equal to 38C (100.4F), Mild Pain (1 - 3)  fluticasone propionate 50 MICROgram(s)/spray Nasal Spray 1 Spray(s) Both Nostrils two times a day PRN nasal congestion  guaifenesin/dextromethorphan Oral Liquid 10 milliLiter(s) Oral every 6 hours PRN Cough  ibuprofen  Tablet. 400 milliGRAM(s) Oral four times a day PRN Mild Pain (1 - 3)      Allergies:     No Known Allergies            REVIEW OF SYSTEMS:  General:  No weight loss, fevers, or chills.  Eyes:  No reported pain or visual changes  ENT:  No sore throat or runny nose.  NECK: No stiffness   CV:  No chest pain or palpitations.  Resp:  No shortness of breath, cough  GI:  No abdominal pain, nausea, vomiting, dysphagia, diarrhea or constipation. No rectal bleeding, melena, or hematemesis.  Neuro:  No tingling, numbness         VITAL SIGNS:   T(F): 97.8 (09-30-22 @ 05:18), Max: 97.9 (09-29-22 @ 21:00)  HR: 57 (09-30-22 @ 05:18) (56 - 61)  BP: 125/72 (09-30-22 @ 05:18) (124/69 - 128/72)  RR: 16 (09-30-22 @ 05:18) (16 - 16)  SpO2: --    PHYSICAL EXAM:  GENERAL: AAOx3, no acute distress.  HEAD:  Atraumatic, Normocephalic  EYES: conjunctiva and sclera clear  NECK: Supple, no JVD or thyromegaly  CHEST/LUNG: Clear to auscultation bilaterally; No wheeze, rhonchi, or rales  HEART: Regular rate and rhythm; normal S1, S2, No murmurs.  ABDOMEN: Soft, nontender, nondistended; Bowel sounds present  NEUROLOGY: No asterixis or tremor.   SKIN: Intact, no jaundice            LABS:                        10.7   7.98  )-----------( 417      ( 30 Sep 2022 07:05 )             34.9     09-30    144  |  110  |  14  ----------------------------<  88  4.5   |  25  |  1.0    Ca    9.7      30 Sep 2022 07:05  Mg     1.8     09-30    TPro  5.4<L>  /  Alb  3.5  /  TBili  0.5  /  DBili  x   /  AST  17  /  ALT  71<H>  /  AlkPhos  157<H>  09-30    LIVER FUNCTIONS - ( 30 Sep 2022 07:05 )  Alb: 3.5 g/dL / Pro: 5.4 g/dL / ALK PHOS: 157 U/L / ALT: 71 U/L / AST: 17 U/L / GGT: x               IMAGING:    < from: CT Abdomen and Pelvis w/ IV Cont (09.22.22 @ 18:33) >  ACC: [id number] EXAM:  CT ABDOMEN AND PELVIS IC                          PROCEDURE DATE:  09/22/2022          INTERPRETATION:  CLINICAL STATEMENT: Epigastric pain  urinary symptoms.   WBC 8.87  Lipase 131    TECHNIQUE: Contiguous axial CT images wereobtained from the lower chest   to the pubic symphysis following the administration of 94 cc Omnipaque   350 intravenous contrast.  Oral contrast was administered.  Reformatted   images in the coronal and sagittal planes were acquired.    COMPARISON CT: None.    FINDINGS:    LOWER CHEST: Unremarkable.    HEPATOBILIARY: Mild gallbladder wall thickening, nonspecific.    SPLEEN: Unremarkable.    PANCREAS: Indistinct pancreatic head hypodensity, not able to be measured   (4-101).    ADRENAL GLANDS: Thickening of the left adrenal gland. Unremarkable right   adrenal gland.    KIDNEYS: Symmetric renal enhancement. No hydronephrosis. Bilateral renal   cysts and additional subcentimeter hypodensities, too small to   characterize.    ABDOMINOPELVIC NODES: Chip hepatis 1.5 cm lymph node and additional   para-aortic and paracaval lymph nodes..    PELVIC ORGANS: Unremarkable.    PERITONEUM/MESENTERY/BOWEL: No bowel obstruction, pneumoperitoneum or   ascites. Normal appendix. Colonic diverticulosis without acute   diverticulitis. Mild thickening of the descending portion of the duodenum   (4-126).    BONES/SOFT TISSUES: Degenerative changes of the spine. Grade 1   anterolisthesis of L4 on L5.    OTHER: Vascular calcifications.      IMPRESSION:    1. Indistinct pancreatic head hypodensity, possibly due to mild focal   interstitial edematous pancreatitis. In addition, there are subtle   findings of right upper quadrant inflammatory change including mild   gallbladder wall thickening and circumferential duodenal thickening that   may be seen with duodenitis or secondary to pancreatic inflammation.    If patient's symptomatology persists, follow-up CT with intravenous   contrast is recommended to further evaluate the pancreas.      2. Chip hepatis 1.5 cm lymph node and additional para-aortic and   paracaval lymph nodes, possibly reactive. Short-term follow-up with MRI   is recommended to exclude other etiologies.    --- End of Report ---          LORNE GOMEZ DO; Resident Radiologist  This document has been electronically signed.  MONTRELL VANEGAS MD; Attending Interventional Radiologist  This document has been electronically signed. Sep 22 2022  7:33PM    < end of copied text >

## 2022-09-30 NOTE — DISCHARGE NOTE PROVIDER - NSDCCPCAREPLAN_GEN_ALL_CORE_FT
PRINCIPAL DISCHARGE DIAGNOSIS  Diagnosis: Pancreatitis  Assessment and Plan of Treatment:       SECONDARY DISCHARGE DIAGNOSES  Diagnosis: Enlarged lymph node  Assessment and Plan of Treatment: the CT scan showed enlarged Lymph node in the abdomen, Chip hepatis 1.5 cm lymph node and additional para-aortic and   paracaval lymph nodes, possibly reactive.   this need Short-term follow-up with MRI   is recommended to exclude other etiologies.     PRINCIPAL DISCHARGE DIAGNOSIS  Diagnosis: Pancreatitis  Assessment and Plan of Treatment: patient under went Cholecystectomy : follow up with Dr Marvin de la rosa 2 weeks, may shower, keep bandage in place till you see the surgeo      SECONDARY DISCHARGE DIAGNOSES  Diagnosis: Enlarged lymph node  Assessment and Plan of Treatment: the CT scan showed enlarged Lymph node in the abdomen, Chip hepatis 1.5 cm lymph node and additional para-aortic and   paracaval lymph nodes, possibly reactive.   this need Short-term follow-up with MRI   is recommended to exclude other etiologies.    Diagnosis: SVT (supraventricular tachycardia)  Assessment and Plan of Treatment: started on Cardizem for rate control, follow up with Dr Real of Electrophysiology for forther evaluation

## 2022-10-01 ENCOUNTER — TRANSCRIPTION ENCOUNTER (OUTPATIENT)
Age: 67
End: 2022-10-01

## 2022-10-01 VITALS
TEMPERATURE: 98 F | HEART RATE: 63 BPM | RESPIRATION RATE: 18 BRPM | SYSTOLIC BLOOD PRESSURE: 109 MMHG | DIASTOLIC BLOOD PRESSURE: 62 MMHG

## 2022-10-01 LAB
ALBUMIN SERPL ELPH-MCNC: 3.4 G/DL — LOW (ref 3.5–5.2)
ALP SERPL-CCNC: 161 U/L — HIGH (ref 30–115)
ALT FLD-CCNC: 86 U/L — HIGH (ref 0–41)
ANION GAP SERPL CALC-SCNC: 10 MMOL/L — SIGNIFICANT CHANGE UP (ref 7–14)
AST SERPL-CCNC: 44 U/L — HIGH (ref 0–41)
BASOPHILS # BLD AUTO: 0.1 K/UL — SIGNIFICANT CHANGE UP (ref 0–0.2)
BASOPHILS NFR BLD AUTO: 0.9 % — SIGNIFICANT CHANGE UP (ref 0–1)
BILIRUB SERPL-MCNC: 0.5 MG/DL — SIGNIFICANT CHANGE UP (ref 0.2–1.2)
BUN SERPL-MCNC: 18 MG/DL — SIGNIFICANT CHANGE UP (ref 10–20)
CALCIUM SERPL-MCNC: 9.5 MG/DL — SIGNIFICANT CHANGE UP (ref 8.4–10.5)
CHLORIDE SERPL-SCNC: 106 MMOL/L — SIGNIFICANT CHANGE UP (ref 98–110)
CO2 SERPL-SCNC: 24 MMOL/L — SIGNIFICANT CHANGE UP (ref 17–32)
CREAT SERPL-MCNC: 1.1 MG/DL — SIGNIFICANT CHANGE UP (ref 0.7–1.5)
EGFR: 56 ML/MIN/1.73M2 — LOW
EOSINOPHIL # BLD AUTO: 0.04 K/UL — SIGNIFICANT CHANGE UP (ref 0–0.7)
EOSINOPHIL NFR BLD AUTO: 0.4 % — SIGNIFICANT CHANGE UP (ref 0–8)
GLUCOSE SERPL-MCNC: 103 MG/DL — HIGH (ref 70–99)
HCT VFR BLD CALC: 34.2 % — LOW (ref 37–47)
HGB BLD-MCNC: 10.7 G/DL — LOW (ref 12–16)
IMM GRANULOCYTES NFR BLD AUTO: 0.4 % — HIGH (ref 0.1–0.3)
LYMPHOCYTES # BLD AUTO: 1.44 K/UL — SIGNIFICANT CHANGE UP (ref 1.2–3.4)
LYMPHOCYTES # BLD AUTO: 12.7 % — LOW (ref 20.5–51.1)
MAGNESIUM SERPL-MCNC: 1.7 MG/DL — LOW (ref 1.8–2.4)
MCHC RBC-ENTMCNC: 29.6 PG — SIGNIFICANT CHANGE UP (ref 27–31)
MCHC RBC-ENTMCNC: 31.3 G/DL — LOW (ref 32–37)
MCV RBC AUTO: 94.5 FL — SIGNIFICANT CHANGE UP (ref 81–99)
MONOCYTES # BLD AUTO: 0.71 K/UL — HIGH (ref 0.1–0.6)
MONOCYTES NFR BLD AUTO: 6.3 % — SIGNIFICANT CHANGE UP (ref 1.7–9.3)
NEUTROPHILS # BLD AUTO: 9.01 K/UL — HIGH (ref 1.4–6.5)
NEUTROPHILS NFR BLD AUTO: 79.3 % — HIGH (ref 42.2–75.2)
NRBC # BLD: 0 /100 WBCS — SIGNIFICANT CHANGE UP (ref 0–0)
PLATELET # BLD AUTO: 457 K/UL — HIGH (ref 130–400)
POTASSIUM SERPL-MCNC: 4.3 MMOL/L — SIGNIFICANT CHANGE UP (ref 3.5–5)
POTASSIUM SERPL-SCNC: 4.3 MMOL/L — SIGNIFICANT CHANGE UP (ref 3.5–5)
PROT SERPL-MCNC: 5.4 G/DL — LOW (ref 6–8)
RBC # BLD: 3.62 M/UL — LOW (ref 4.2–5.4)
RBC # FLD: 13.2 % — SIGNIFICANT CHANGE UP (ref 11.5–14.5)
SODIUM SERPL-SCNC: 140 MMOL/L — SIGNIFICANT CHANGE UP (ref 135–146)
WBC # BLD: 11.34 K/UL — HIGH (ref 4.8–10.8)
WBC # FLD AUTO: 11.34 K/UL — HIGH (ref 4.8–10.8)

## 2022-10-01 PROCEDURE — 99239 HOSP IP/OBS DSCHRG MGMT >30: CPT

## 2022-10-01 RX ORDER — OXCARBAZEPINE 300 MG/1
1 TABLET, FILM COATED ORAL
Qty: 0 | Refills: 0 | DISCHARGE
Start: 2022-10-01

## 2022-10-01 RX ORDER — MAGNESIUM SULFATE 500 MG/ML
2 VIAL (ML) INJECTION ONCE
Refills: 0 | Status: COMPLETED | OUTPATIENT
Start: 2022-10-01 | End: 2022-10-01

## 2022-10-01 RX ORDER — OXCARBAZEPINE 300 MG/1
0 TABLET, FILM COATED ORAL
Qty: 0 | Refills: 0 | DISCHARGE

## 2022-10-01 RX ORDER — DILTIAZEM HCL 120 MG
1 CAPSULE, EXT RELEASE 24 HR ORAL
Qty: 30 | Refills: 1
Start: 2022-10-01 | End: 2022-11-29

## 2022-10-01 RX ORDER — ACETAMINOPHEN 500 MG
2 TABLET ORAL
Qty: 0 | Refills: 0 | DISCHARGE

## 2022-10-01 RX ORDER — ZOLPIDEM TARTRATE 10 MG/1
1 TABLET ORAL
Qty: 0 | Refills: 0 | DISCHARGE
Start: 2022-10-01

## 2022-10-01 RX ORDER — HALOPERIDOL DECANOATE 100 MG/ML
0 INJECTION INTRAMUSCULAR
Qty: 0 | Refills: 0 | DISCHARGE

## 2022-10-01 RX ORDER — HALOPERIDOL DECANOATE 100 MG/ML
1 INJECTION INTRAMUSCULAR
Qty: 0 | Refills: 0 | DISCHARGE
Start: 2022-10-01

## 2022-10-01 RX ORDER — POLYETHYLENE GLYCOL 3350 17 G/17G
17 POWDER, FOR SOLUTION ORAL DAILY
Refills: 0 | Status: DISCONTINUED | OUTPATIENT
Start: 2022-10-01 | End: 2022-10-01

## 2022-10-01 RX ORDER — SENNA PLUS 8.6 MG/1
2 TABLET ORAL AT BEDTIME
Refills: 0 | Status: DISCONTINUED | OUTPATIENT
Start: 2022-10-01 | End: 2022-10-01

## 2022-10-01 RX ORDER — BUPROPION HYDROCHLORIDE 150 MG/1
0 TABLET, EXTENDED RELEASE ORAL
Qty: 0 | Refills: 0 | DISCHARGE

## 2022-10-01 RX ORDER — FLUCONAZOLE 150 MG/1
0 TABLET ORAL
Qty: 0 | Refills: 0 | DISCHARGE

## 2022-10-01 RX ORDER — BUPROPION HYDROCHLORIDE 150 MG/1
1 TABLET, EXTENDED RELEASE ORAL
Qty: 0 | Refills: 0 | DISCHARGE

## 2022-10-01 RX ORDER — POLYETHYLENE GLYCOL 3350 17 G/17G
17 POWDER, FOR SOLUTION ORAL
Qty: 28 | Refills: 0
Start: 2022-10-01

## 2022-10-01 RX ADMIN — MORPHINE SULFATE 2 MILLIGRAM(S): 50 CAPSULE, EXTENDED RELEASE ORAL at 03:28

## 2022-10-01 RX ADMIN — HALOPERIDOL DECANOATE 2 MILLIGRAM(S): 100 INJECTION INTRAMUSCULAR at 12:39

## 2022-10-01 RX ADMIN — BUPROPION HYDROCHLORIDE 150 MILLIGRAM(S): 150 TABLET, EXTENDED RELEASE ORAL at 11:36

## 2022-10-01 RX ADMIN — PANTOPRAZOLE SODIUM 40 MILLIGRAM(S): 20 TABLET, DELAYED RELEASE ORAL at 11:36

## 2022-10-01 RX ADMIN — Medication 1 TABLET(S): at 05:34

## 2022-10-01 RX ADMIN — Medication 25 GRAM(S): at 11:36

## 2022-10-01 RX ADMIN — Medication 120 MILLIGRAM(S): at 05:33

## 2022-10-01 RX ADMIN — OXCARBAZEPINE 150 MILLIGRAM(S): 300 TABLET, FILM COATED ORAL at 05:33

## 2022-10-01 RX ADMIN — MORPHINE SULFATE 2 MILLIGRAM(S): 50 CAPSULE, EXTENDED RELEASE ORAL at 03:19

## 2022-10-01 NOTE — PROGRESS NOTE ADULT - SUBJECTIVE AND OBJECTIVE BOX
.  Patient seen & examined.  No acute events noted overnight.  Patient reports feels well with some mild incisional pain with movement.  Patient asking when she can be discharged.   Patient tolerates diet well.  + Flatus, No BM.  Patient denies subjective fever, chills, tremors, N/V/D, CP or SOB.           MEDICATIONS  (STANDING):  amoxicillin  875 milliGRAM(s)/clavulanate 1 Tablet(s) Oral two times a day  buPROPion XL (24-Hour) . 150 milliGRAM(s) Oral daily  diltiazem    milliGRAM(s) Oral daily  haloperidol     Tablet 2 milliGRAM(s) Oral daily  heparin   Injectable 5000 Unit(s) SubCutaneous every 12 hours  magnesium sulfate  IVPB 2 Gram(s) IV Intermittent once  OXcarbazepine 150 milliGRAM(s) Oral two times a day  pantoprazole  Injectable 40 milliGRAM(s) IV Push daily    MEDICATIONS  (PRN):  acetaminophen     Tablet .. 650 milliGRAM(s) Oral every 6 hours PRN Temp greater or equal to 38C (100.4F), Mild Pain (1 - 3)  fluticasone propionate 50 MICROgram(s)/spray Nasal Spray 1 Spray(s) Both Nostrils two times a day PRN nasal congestion  guaifenesin/dextromethorphan Oral Liquid 10 milliLiter(s) Oral every 6 hours PRN Cough  ibuprofen  Tablet. 400 milliGRAM(s) Oral four times a day PRN Mild Pain (1 - 3)  LORazepam     Tablet 0.5 milliGRAM(s) Oral two times a day PRN Anxiety  morphine  - Injectable 2 milliGRAM(s) IV Push every 4 hours PRN Moderate Pain (4 - 6)  ondansetron Injectable 4 milliGRAM(s) IV Push once PRN Nausea and/or Vomiting  zolpidem 5 milliGRAM(s) Oral at bedtime PRN Insomnia          Vital Signs Last 24 Hrs  T(C): 35.8 (01 Oct 2022 05:23), Max: 36.6 (30 Sep 2022 11:37)  T(F): 96.5 (01 Oct 2022 05:23), Max: 97.9 (30 Sep 2022 11:37)  HR: 58 (01 Oct 2022 05:23) (56 - 64)  BP: 108/63 (01 Oct 2022 05:23) (108/63 - 145/77)  RR: 18 (01 Oct 2022 05:23) (16 - 18)  SpO2: 97% (30 Sep 2022 16:28) (96% - 99%)    Parameters below as of 30 Sep 2022 16:28  Patient On (Oxygen Delivery Method): nasal cannula  O2 Flow (L/min): 3        Physical Exam:  General:  WD, WN, conversant in NAD.   Neck:  Supple, No JVD.  Chest:  Clear to auscultation bilaterally, Equal expansion bilaterally, equal breath sounds, No W/R/R.  CV:  S1 & S2, RRR, No M/R/G.   Abdomen:  + Bowel sounds, soft, no distention.  Dressings and incisions clean and dry w/o bleeding, D/C or erythema,  Mild incisional pain with palpation.  No rebound/guarding or peritoneal signs.    Extremities:  No C/C/E,  No calf tenderness B/L.    Neuro: AxAxOx4, No focal deficits.           LABS:                        10.7   11.34 )-----------( 457      ( 01 Oct 2022 06:15 )             34.2     10-01    140  |  106  |  18  ----------------------------<  103<H>  4.3   |  24  |  1.1    Ca    9.5      01 Oct 2022 06:15  Mg     1.7     10-01    TPro  5.4<L>  /  Alb  3.4<L>  /  TBili  0.5  /  DBili  x   /  AST  44<H>  /  ALT  86<H>  /  AlkPhos  161<H>  10-01          . .  s/p Laparoscopic Cholecystectomy -- POD #1      Patient seen & examined.  No acute events noted overnight.  Patient reports feels well with some mild incisional pain with movement.  Patient asking when she can be discharged.   Patient tolerates diet well.  + Flatus, No BM.  Patient denies subjective fever, chills, tremors, N/V/D, CP or SOB.           MEDICATIONS  (STANDING):  amoxicillin  875 milliGRAM(s)/clavulanate 1 Tablet(s) Oral two times a day  buPROPion XL (24-Hour) . 150 milliGRAM(s) Oral daily  diltiazem    milliGRAM(s) Oral daily  haloperidol     Tablet 2 milliGRAM(s) Oral daily  heparin   Injectable 5000 Unit(s) SubCutaneous every 12 hours  magnesium sulfate  IVPB 2 Gram(s) IV Intermittent once  OXcarbazepine 150 milliGRAM(s) Oral two times a day  pantoprazole  Injectable 40 milliGRAM(s) IV Push daily    MEDICATIONS  (PRN):  acetaminophen     Tablet .. 650 milliGRAM(s) Oral every 6 hours PRN Temp greater or equal to 38C (100.4F), Mild Pain (1 - 3)  fluticasone propionate 50 MICROgram(s)/spray Nasal Spray 1 Spray(s) Both Nostrils two times a day PRN nasal congestion  guaifenesin/dextromethorphan Oral Liquid 10 milliLiter(s) Oral every 6 hours PRN Cough  ibuprofen  Tablet. 400 milliGRAM(s) Oral four times a day PRN Mild Pain (1 - 3)  LORazepam     Tablet 0.5 milliGRAM(s) Oral two times a day PRN Anxiety  morphine  - Injectable 2 milliGRAM(s) IV Push every 4 hours PRN Moderate Pain (4 - 6)  ondansetron Injectable 4 milliGRAM(s) IV Push once PRN Nausea and/or Vomiting  zolpidem 5 milliGRAM(s) Oral at bedtime PRN Insomnia          Vital Signs Last 24 Hrs  T(C): 35.8 (01 Oct 2022 05:23), Max: 36.6 (30 Sep 2022 11:37)  T(F): 96.5 (01 Oct 2022 05:23), Max: 97.9 (30 Sep 2022 11:37)  HR: 58 (01 Oct 2022 05:23) (56 - 64)  BP: 108/63 (01 Oct 2022 05:23) (108/63 - 145/77)  RR: 18 (01 Oct 2022 05:23) (16 - 18)  SpO2: 97% (30 Sep 2022 16:28) (96% - 99%)    Parameters below as of 30 Sep 2022 16:28  Patient On (Oxygen Delivery Method): nasal cannula  O2 Flow (L/min): 3        Physical Exam:  General:  WD, WN, conversant in NAD.   Neck:  Supple, No JVD.  Chest:  Clear to auscultation bilaterally, Equal expansion bilaterally, equal breath sounds, No W/R/R.  CV:  S1 & S2, RRR, No M/R/G.   Abdomen:  + Bowel sounds, soft, no distention.  Dressings and incisions clean and dry w/o bleeding, D/C or erythema,  Mild incisional pain with palpation.  No rebound/guarding or peritoneal signs.    Extremities:  No C/C/E,  No calf tenderness B/L.    Neuro: AxAxOx4, No focal deficits.           LABS:                        10.7   11.34 )-----------( 457      ( 01 Oct 2022 06:15 )             34.2     10-01    140  |  106  |  18  ----------------------------<  103<H>  4.3   |  24  |  1.1    Ca    9.5      01 Oct 2022 06:15  Mg     1.7     10-01    TPro  5.4<L>  /  Alb  3.4<L>  /  TBili  0.5  /  DBili  x   /  AST  44<H>  /  ALT  86<H>  /  AlkPhos  161<H>  10-01          .

## 2022-10-01 NOTE — PROGRESS NOTE ADULT - PROVIDER SPECIALTY LIST ADULT
Cardiology
Gastroenterology
Hospitalist
Hospitalist
Surgery
Surgery
Cardiology
Cardiology
Hospitalist
Gastroenterology
Hospitalist
Hospitalist
Surgery
Surgery
Hospitalist

## 2022-10-01 NOTE — CHART NOTE - NSCHARTNOTEFT_GEN_A_CORE
<<<RESIDENT DISCHARGE NOTE>>>     NICKO EDGAR  MRN-[id number]  Pt seen and examined, has no pain, tolerates diet well, passes gases, cleared for discharge from medical and surgical team,   VITAL SIGNS:  T(F): 97.7 (10-01-22 @ 13:12), Max: 97.7 (10-01-22 @ 13:12)  HR: 63 (10-01-22 @ 13:12)  BP: 109/62 (10-01-22 @ 13:12)  SpO2: 97% (09-30-22 @ 16:28)      CONSTITUTIONAL: No acute distress, well-developed, well-groomed, AAOx3  HEAD: Atraumatic, normocephalic  EYES: EOM intact, PERRLA, conjunctiva and sclera clear  ENT: Supple, no masses, no thyromegaly, no bruits, no JVD; moist mucous membranes  PULMONARY: Clear to auscultation bilaterally; no wheezes, rales, or rhonchi  CARDIOVASCULAR: Regular rate and rhythm; no murmurs, rubs, or gallops  GASTROINTESTINAL: Soft, non-tender, non-distended; bowel sounds present  MUSCULOSKELETAL: 2+ peripheral pulses; no clubbing, no cyanosis, no edema  NEUROLOGY: non-focal  SKIN: No rashes or lesions; warm and dry    TEST RESULTS:                        10.7   11.34 )-----------( 457      ( 01 Oct 2022 06:15 )             34.2       10-01    140  |  106  |  18  ----------------------------<  103<H>  4.3   |  24  |  1.1    Ca    9.5      01 Oct 2022 06:15  Mg     1.7     10-01    TPro  5.4<L>  /  Alb  3.4<L>  /  TBili  0.5  /  DBili  x   /  AST  44<H>  /  ALT  86<H>  /  AlkPhos  161<H>  10-01

## 2022-10-01 NOTE — DISCHARGE NOTE NURSING/CASE MANAGEMENT/SOCIAL WORK - PATIENT PORTAL LINK FT
You can access the FollowMyHealth Patient Portal offered by [facility] by registering at the following website: [email or web address]. By joining [facility]’s FollowMyHealth portal, you will also be able to view your health information using other applications (apps) compatible with our system.

## 2022-10-01 NOTE — PROGRESS NOTE ADULT - ASSESSMENT
Impression:   66 y/o female s/p Laparoscopic Cholecystectomy (POD # 1).          Plan:    - Continue  Low fat diet as tolerated.  - Antibiotics:  Completed 2 doses post procedure per protocol and patient now on PO Amoxicillin BID.   - VTE prophylaxis with Heparin SQ and SCD's.  - GI prophylaxis with Protonix daily.   - Pain medications as needed with Tylenol PRN mild pain and Morphine PRN moderate to severe pain.   - Continue home medications for co-morbidities.   - Encourage incentive spirometer 10 times every hour.  - Encourage OOB to chair and ambulation.    - Case discussed with Dr. Wong. Impression:   66 y/o female s/p Laparoscopic Cholecystectomy (POD # 1).          Plan:    - Continue  Low fat diet as tolerated.  - Antibiotics:  Completed 2 doses post procedure per protocol and patient now on PO Amoxicillin BID.   - VTE prophylaxis with Heparin SQ and SCD's.  - GI prophylaxis with Protonix daily.   - Pain medications as needed with Tylenol PRN mild pain and Morphine PRN moderate to severe pain.   - Continue home medications for co-morbidities.   - Encourage incentive spirometer 10 times every hour.  - Encourage OOB to chair and ambulation.    - Case discussed with Dr. Wong and states can be discharged from a surgical perspective with pain medications, no need for further Abx, can shower with dressings in place and he will remove in office and patient should follow up with him in 2 weeks.

## 2022-10-01 NOTE — DISCHARGE NOTE NURSING/CASE MANAGEMENT/SOCIAL WORK - NSDCPEFALRISK_GEN_ALL_CORE
For information on Fall & Injury Prevention, visit: [email or web address] OR  [email or web address] OR  https://www.cdc.gov/steadi/patient.html

## 2022-10-01 NOTE — PROGRESS NOTE ADULT - REASON FOR ADMISSION
urinary symptoms

## 2022-10-01 NOTE — CHART NOTE - NSCHARTNOTESELECT_GEN_ALL_CORE
Constipation/Event Note
Event Note
Event Note
PA NOTE
anesthesia note/Transfer Note
chart note
Cardiology/Event Note
Cardiology/Event Note
EUS/Event Note
Event Note

## 2022-10-04 PROBLEM — F31.9 BIPOLAR DISORDER, UNSPECIFIED: Chronic | Status: ACTIVE | Noted: 2022-09-22

## 2022-10-04 PROBLEM — Z00.00 ENCOUNTER FOR PREVENTIVE HEALTH EXAMINATION: Status: ACTIVE | Noted: 2022-10-04

## 2022-10-09 LAB — SURGICAL PATHOLOGY STUDY: SIGNIFICANT CHANGE UP

## 2022-10-10 DIAGNOSIS — K59.00 CONSTIPATION, UNSPECIFIED: ICD-10-CM

## 2022-10-10 DIAGNOSIS — K85.90 ACUTE PANCREATITIS WITHOUT NECROSIS OR INFECTION, UNSPECIFIED: ICD-10-CM

## 2022-10-10 DIAGNOSIS — I95.9 HYPOTENSION, UNSPECIFIED: ICD-10-CM

## 2022-10-10 DIAGNOSIS — K86.2 CYST OF PANCREAS: ICD-10-CM

## 2022-10-10 DIAGNOSIS — K66.0 PERITONEAL ADHESIONS (POSTPROCEDURAL) (POSTINFECTION): ICD-10-CM

## 2022-10-10 DIAGNOSIS — R74.01 ELEVATION OF LEVELS OF LIVER TRANSAMINASE LEVELS: ICD-10-CM

## 2022-10-10 DIAGNOSIS — N39.0 URINARY TRACT INFECTION, SITE NOT SPECIFIED: ICD-10-CM

## 2022-10-10 DIAGNOSIS — K81.0 ACUTE CHOLECYSTITIS: ICD-10-CM

## 2022-10-10 DIAGNOSIS — N17.9 ACUTE KIDNEY FAILURE, UNSPECIFIED: ICD-10-CM

## 2022-10-10 DIAGNOSIS — I47.1 SUPRAVENTRICULAR TACHYCARDIA: ICD-10-CM

## 2022-10-10 DIAGNOSIS — K76.4: ICD-10-CM

## 2022-10-10 DIAGNOSIS — K82.8 OTHER SPECIFIED DISEASES OF GALLBLADDER: ICD-10-CM

## 2022-10-24 ENCOUNTER — APPOINTMENT (OUTPATIENT)
Dept: GASTROENTEROLOGY | Facility: CLINIC | Age: 67
End: 2022-10-24

## 2022-10-24 VITALS — BODY MASS INDEX: 19.7 KG/M2 | HEIGHT: 68 IN | WEIGHT: 130 LBS

## 2022-10-24 DIAGNOSIS — Z78.9 OTHER SPECIFIED HEALTH STATUS: ICD-10-CM

## 2022-10-24 DIAGNOSIS — Z87.19 PERSONAL HISTORY OF OTHER DISEASES OF THE DIGESTIVE SYSTEM: ICD-10-CM

## 2022-10-24 DIAGNOSIS — K85.90 ACUTE PANCREATITIS WITHOUT NECROSIS OR INFECTION, UNSPECIFIED: ICD-10-CM

## 2022-10-24 DIAGNOSIS — K86.2 CYST OF PANCREAS: ICD-10-CM

## 2022-10-24 PROCEDURE — 99214 OFFICE O/P EST MOD 30 MIN: CPT

## 2022-10-24 RX ORDER — LORAZEPAM 0.5 MG/1
0.5 TABLET ORAL
Qty: 30 | Refills: 0 | Status: ACTIVE | COMMUNITY
Start: 2022-05-16

## 2022-10-24 NOTE — HISTORY OF PRESENT ILLNESS
[FreeTextEntry1] : 67 year old female, S/P hospitalization last month for Acute Pancreatitis that was thought to be due to gallstones. EUS done 09/28/2022 showed pancreatic cysts with surrounding inflammation thereby limiting the examination. The PD was dilated at 4.9 mm. The GB was noted to have sludge and there were no stones in the CBD.  She had laparoscopic cholecystectomy on 9/30/2022; pathology showed chronic cholecystitis. She denied any further abdominal pain, fevers, vomiting, diarrhea, decreased appetite , weight loss, dysphagia, constipation, heartburn, blood in stools, or melena. She denied a family H/O pancreatic, liver, gastric, esophageal, or colon cancer.  [de-identified] : 09/28/22 [de-identified] : 09/22/2022: reviewed, hypodensity at the pancreatic head, RUQ inflammation, GB wall thickening

## 2022-10-24 NOTE — END OF VISIT
[FreeTextEntry3] : Evaluated and examined with the PA. Awaiting MRI/MRCp of the pancreas. SP lap CCY/ No symptoms currently. Will follow up after MRI to decide on the possibility of repeat EUS +/- FNA

## 2022-10-24 NOTE — ASSESSMENT
[FreeTextEntry1] : H/O Pancreatitis thought to be due to gallstones, however no gallstones were clearly noted on EUS, (GB sludge), currently asymptomatic post- laparoscopic cholecystectomy. H/O Pancreatic cysts noted on EUS\par \par MRI of Abdomen with and without Contrast to further evaluate pancreas \par F/U in the office in one month for MRI results\par EGD and EUS results were discussed with the patient today\par May need an EUS with FNA of the pancreatic cyst pending MRI of Abdomen results

## 2022-10-24 NOTE — PHYSICAL EXAM
[Alert] : alert [Normal Voice/Communication] : normal voice/communication [Healthy Appearing] : healthy appearing [No Acute Distress] : no acute distress [Sclera] : the sclera and conjunctiva were normal [None] : no edema [No CVA Tenderness] : no CVA  tenderness [Abnormal Walk] : normal gait [Normal Color / Pigmentation] : normal skin color and pigmentation [No Focal Deficits] : no focal deficits [Normal] : oriented to person, place, and time [de-identified] : No adenopathy

## 2022-11-14 ENCOUNTER — APPOINTMENT (OUTPATIENT)
Dept: CARDIOLOGY | Facility: CLINIC | Age: 67
End: 2022-11-14

## 2023-01-09 ENCOUNTER — APPOINTMENT (OUTPATIENT)
Dept: ELECTROPHYSIOLOGY | Facility: CLINIC | Age: 68
End: 2023-01-09
Payer: MEDICARE

## 2023-01-09 VITALS
SYSTOLIC BLOOD PRESSURE: 111 MMHG | BODY MASS INDEX: 21.26 KG/M2 | HEART RATE: 62 BPM | WEIGHT: 157 LBS | HEIGHT: 72 IN | DIASTOLIC BLOOD PRESSURE: 71 MMHG | TEMPERATURE: 97.3 F

## 2023-01-09 DIAGNOSIS — Z80.0 FAMILY HISTORY OF MALIGNANT NEOPLASM OF DIGESTIVE ORGANS: ICD-10-CM

## 2023-01-09 DIAGNOSIS — F32.A ANXIETY DISORDER, UNSPECIFIED: ICD-10-CM

## 2023-01-09 DIAGNOSIS — Z78.9 OTHER SPECIFIED HEALTH STATUS: ICD-10-CM

## 2023-01-09 DIAGNOSIS — Z80.6 FAMILY HISTORY OF LEUKEMIA: ICD-10-CM

## 2023-01-09 DIAGNOSIS — F12.91 CANNABIS USE, UNSPECIFIED, IN REMISSION: ICD-10-CM

## 2023-01-09 DIAGNOSIS — F41.9 ANXIETY DISORDER, UNSPECIFIED: ICD-10-CM

## 2023-01-09 DIAGNOSIS — E78.00 PURE HYPERCHOLESTEROLEMIA, UNSPECIFIED: ICD-10-CM

## 2023-01-09 PROCEDURE — 93000 ELECTROCARDIOGRAM COMPLETE: CPT

## 2023-01-09 PROCEDURE — 99214 OFFICE O/P EST MOD 30 MIN: CPT

## 2023-01-09 RX ORDER — BUPROPION HYDROCHLORIDE 150 MG/1
150 TABLET, EXTENDED RELEASE ORAL
Qty: 90 | Refills: 0 | Status: COMPLETED | COMMUNITY
Start: 2022-05-13 | End: 2023-01-09

## 2023-01-09 RX ORDER — HALOPERIDOL 2 MG/1
2 TABLET ORAL
Qty: 30 | Refills: 0 | Status: COMPLETED | COMMUNITY
Start: 2022-04-12 | End: 2023-01-09

## 2023-01-09 RX ORDER — OXCARBAZEPINE 150 MG/1
150 TABLET, FILM COATED ORAL
Qty: 60 | Refills: 0 | Status: COMPLETED | COMMUNITY
Start: 2022-10-06 | End: 2023-01-09

## 2023-01-09 RX ORDER — AZELASTINE HYDROCHLORIDE 137 UG/1
137 SPRAY, METERED NASAL
Qty: 30 | Refills: 0 | Status: ACTIVE | COMMUNITY
Start: 2022-12-20

## 2023-01-09 RX ORDER — IBUPROFEN 800 MG/1
800 TABLET, FILM COATED ORAL
Qty: 20 | Refills: 0 | Status: COMPLETED | COMMUNITY
Start: 2022-06-09 | End: 2023-01-09

## 2023-01-09 RX ORDER — NITROFURANTOIN (MONOHYDRATE/MACROCRYSTALS) 25; 75 MG/1; MG/1
100 CAPSULE ORAL
Qty: 10 | Refills: 0 | Status: COMPLETED | COMMUNITY
Start: 2022-09-18 | End: 2023-01-09

## 2023-01-09 RX ORDER — SULFAMETHOXAZOLE AND TRIMETHOPRIM 800; 160 MG/1; MG/1
800-160 TABLET ORAL
Qty: 6 | Refills: 0 | Status: COMPLETED | COMMUNITY
Start: 2022-09-15 | End: 2023-01-09

## 2023-01-09 RX ORDER — BUPROPION HYDROCHLORIDE 150 MG/1
150 TABLET, FILM COATED ORAL DAILY
Refills: 0 | Status: ACTIVE | COMMUNITY

## 2023-01-09 RX ORDER — LITHIUM CARBONATE 300 MG/1
300 CAPSULE ORAL
Qty: 60 | Refills: 0 | Status: COMPLETED | COMMUNITY
Start: 2022-05-16 | End: 2023-01-09

## 2023-01-09 RX ORDER — AMOXICILLIN 500 MG/1
500 TABLET, FILM COATED ORAL
Qty: 21 | Refills: 0 | Status: COMPLETED | COMMUNITY
Start: 2022-06-09 | End: 2023-01-09

## 2023-01-09 RX ORDER — ZOLPIDEM TARTRATE 10 MG/1
10 TABLET ORAL
Qty: 30 | Refills: 0 | Status: COMPLETED | COMMUNITY
Start: 2022-02-01 | End: 2023-01-09

## 2023-01-09 RX ORDER — POLYETHYLENE GLYCOL 3350 17 G/17G
17 POWDER, FOR SOLUTION ORAL
Qty: 119 | Refills: 0 | Status: COMPLETED | COMMUNITY
Start: 2022-10-01 | End: 2023-01-09

## 2023-01-09 RX ORDER — LAMOTRIGINE 25 MG/1
25 TABLET ORAL
Qty: 60 | Refills: 0 | Status: COMPLETED | COMMUNITY
Start: 2022-06-09 | End: 2023-01-09

## 2023-01-09 RX ORDER — FLUCONAZOLE 150 MG/1
150 TABLET ORAL
Qty: 1 | Refills: 0 | Status: COMPLETED | COMMUNITY
Start: 2022-09-18 | End: 2023-01-09

## 2023-01-09 RX ORDER — CHLORHEXIDINE GLUCONATE, 0.12% ORAL RINSE 1.2 MG/ML
0.12 SOLUTION DENTAL
Qty: 473 | Refills: 0 | Status: COMPLETED | COMMUNITY
Start: 2022-06-09 | End: 2023-01-09

## 2023-01-09 RX ORDER — ZOLPIDEM TARTRATE 10 MG/1
10 TABLET, FILM COATED ORAL DAILY
Refills: 0 | Status: ACTIVE | COMMUNITY

## 2023-02-21 ENCOUNTER — APPOINTMENT (OUTPATIENT)
Dept: CARDIOLOGY | Facility: CLINIC | Age: 68
End: 2023-02-21

## 2023-02-28 ENCOUNTER — APPOINTMENT (OUTPATIENT)
Dept: CARDIOLOGY | Facility: CLINIC | Age: 68
End: 2023-02-28
Payer: MEDICARE

## 2023-02-28 VITALS
DIASTOLIC BLOOD PRESSURE: 75 MMHG | BODY MASS INDEX: 21.4 KG/M2 | SYSTOLIC BLOOD PRESSURE: 110 MMHG | WEIGHT: 158 LBS | HEIGHT: 72 IN

## 2023-02-28 VITALS — HEIGHT: 72 IN | BODY MASS INDEX: 21.4 KG/M2 | WEIGHT: 158 LBS

## 2023-02-28 PROCEDURE — 93000 ELECTROCARDIOGRAM COMPLETE: CPT

## 2023-02-28 PROCEDURE — 99214 OFFICE O/P EST MOD 30 MIN: CPT

## 2023-02-28 NOTE — REASON FOR VISIT
[Symptom and Test Evaluation] : symptom and test evaluation [CV Risk Factors and Non-Cardiac Disease] : CV risk factors and non-cardiac disease [FreeTextEntry1] : Ms. NICKO EDGAR is a 68 year old woman with PMHx of HLD and pSVT who is presenting to establish care and undergo cardiac work up. She feels well today, and has no concerns. She has no chest pain, SOB or palpitations. With significant exertion, she admits she gets dyspneic.\par \par She is a former smoker. She has no FHx of CVD.\par \par

## 2023-02-28 NOTE — ASSESSMENT
[FreeTextEntry1] : Ms. NICKO EDGAR is a 68 year old woman with PMHx of HLD and pSVT who is presenting to establish care and undergo cardiac work up.\par \par -Reviewed TTE from previous admission. \par -Order TTE to rule out structural changes from previous TTE given dyspnea\par -Patient to have labs faxed to clinic\par -Continue diltiazem for pSVT\par -Continue crestor for HLD\par -Recommend MCOT to evaluate frequency of SVT, but patient declined\par \par RTC in 6 months \par \par Willy Walters MD\par Non-Invasive Cardiology\par [facility]\par [facility]\par [address]\par Office: [phone]\par

## 2023-04-29 PROBLEM — E78.00 HIGH BLOOD CHOLESTEROL: Status: ACTIVE | Noted: 2023-01-09

## 2023-04-29 NOTE — REASON FOR VISIT
[Arrhythmia/ECG Abnorrmalities] : arrhythmia/ECG abnormalities [FreeTextEntry3] : Jeanmarie Escudero - Willy Walters

## 2023-04-29 NOTE — DISCUSSION/SUMMARY
[FreeTextEntry1] : Brief SVT in setting of acute illness, likely AT\par started on diltiazem\par no symptoms suggestive of SVT and no palpitations\par I recommend to continue Diltiazem\par I recommend referral to cardiology \par In case of symptoms will offer MCOT\par I discussed at length catheter ablation in case of recurrence of SVT\par \par I discussed with patient plan of care in great details. I answered all her questions to her satisfaction. Patient was pleased with the visit.\par \par Patient will follow with me in 12 months’ time. Please do not hesitate to contact me at [phone] if you have any further questions regarding this patient care.\par \par \par \par  [EKG obtained to assist in diagnosis and management of assessed problem(s)] : EKG obtained to assist in diagnosis and management of assessed problem(s)

## 2023-04-29 NOTE — HISTORY OF PRESENT ILLNESS
[FreeTextEntry1] : hyperlipidemia, SVT, bipolar disorder.\par presented on 9/24/2022 to [facility] with UTI symptoms\par developed NCT at 235 bpm with upright P waves likely AT.\par underwent gallbladder surgery\par since discharge from hospital no palpitations\par She has no chest pain, no shortness of breath, no dyspnea on exertion, no orthopnea, no PND. She denies dizziness, lightheadedness and syncope. She has no exertional symptoms. She presents for evaluation.\par \par

## 2023-04-29 NOTE — CARDIOLOGY SUMMARY
[de-identified] : (1/9/2023) ECG sinus at 62 bpm, first degree AV block, no significant ST/T abnormalities

## 2023-08-01 ENCOUNTER — APPOINTMENT (OUTPATIENT)
Dept: CARDIOLOGY | Facility: CLINIC | Age: 68
End: 2023-08-01
Payer: MEDICARE

## 2023-08-01 PROCEDURE — 93306 TTE W/DOPPLER COMPLETE: CPT

## 2023-10-11 ENCOUNTER — RESULT CHARGE (OUTPATIENT)
Age: 68
End: 2023-10-11

## 2023-10-11 ENCOUNTER — APPOINTMENT (OUTPATIENT)
Dept: CARDIOLOGY | Facility: CLINIC | Age: 68
End: 2023-10-11
Payer: MEDICARE

## 2023-10-11 VITALS
TEMPERATURE: 97.8 F | WEIGHT: 158 LBS | BODY MASS INDEX: 21.4 KG/M2 | SYSTOLIC BLOOD PRESSURE: 114 MMHG | HEART RATE: 76 BPM | HEIGHT: 72 IN | DIASTOLIC BLOOD PRESSURE: 72 MMHG

## 2023-10-11 DIAGNOSIS — Z86.39 PERSONAL HISTORY OF OTHER ENDOCRINE, NUTRITIONAL AND METABOLIC DISEASE: ICD-10-CM

## 2023-10-11 DIAGNOSIS — Z87.891 PERSONAL HISTORY OF NICOTINE DEPENDENCE: ICD-10-CM

## 2023-10-11 DIAGNOSIS — I47.10 SUPRAVENTRICULAR TACHYCARDIA, UNSPECIFIED: ICD-10-CM

## 2023-10-11 DIAGNOSIS — R06.00 DYSPNEA, UNSPECIFIED: ICD-10-CM

## 2023-10-11 PROCEDURE — 99214 OFFICE O/P EST MOD 30 MIN: CPT

## 2023-10-11 PROCEDURE — 93000 ELECTROCARDIOGRAM COMPLETE: CPT

## 2023-10-11 RX ORDER — OXCARBAZEPINE 150 MG/1
150 TABLET, FILM COATED ORAL TWICE DAILY
Refills: 0 | Status: ACTIVE | COMMUNITY

## 2023-10-11 RX ORDER — ASCORBIC ACID 500 MG
TABLET ORAL
Refills: 0 | Status: ACTIVE | COMMUNITY

## 2023-10-11 RX ORDER — GABAPENTIN 100 MG
100 TABLET ORAL DAILY
Refills: 0 | Status: DISCONTINUED | COMMUNITY
End: 2023-10-11

## 2023-10-11 RX ORDER — AZELASTINE HYDROCHLORIDE AND FLUTICASONE PROPIONATE 137; 50 UG/1; UG/1
137-50 SPRAY, METERED NASAL DAILY
Refills: 0 | Status: DISCONTINUED | COMMUNITY
End: 2023-10-11

## 2023-10-11 RX ORDER — DILTIAZEM HYDROCHLORIDE 120 MG/1
120 CAPSULE, EXTENDED RELEASE ORAL DAILY
Refills: 0 | Status: DISCONTINUED | COMMUNITY
End: 2023-10-11

## 2023-10-11 RX ORDER — ROSUVASTATIN CALCIUM 10 MG/1
10 TABLET, FILM COATED ORAL DAILY
Qty: 90 | Refills: 3 | Status: DISCONTINUED | COMMUNITY
End: 2023-10-11

## 2023-10-11 RX ORDER — METHIMAZOLE 5 MG/1
5 TABLET ORAL
Refills: 0 | Status: ACTIVE | COMMUNITY

## 2023-10-11 RX ORDER — HALOPERIDOL 1 MG/1
1 TABLET ORAL DAILY
Refills: 0 | Status: DISCONTINUED | COMMUNITY
End: 2023-10-11

## 2023-10-11 RX ORDER — ROSUVASTATIN CALCIUM 5 MG/1
5 TABLET, FILM COATED ORAL
Refills: 0 | Status: ACTIVE | COMMUNITY

## 2024-01-02 ENCOUNTER — APPOINTMENT (OUTPATIENT)
Dept: CARDIOLOGY | Facility: CLINIC | Age: 69
End: 2024-01-02

## 2024-10-11 ENCOUNTER — APPOINTMENT (OUTPATIENT)
Dept: CARDIOLOGY | Facility: CLINIC | Age: 69
End: 2024-10-11

## 2025-02-11 ENCOUNTER — APPOINTMENT (OUTPATIENT)
Dept: CARDIOLOGY | Facility: CLINIC | Age: 70
End: 2025-02-11